# Patient Record
Sex: FEMALE | Race: WHITE | NOT HISPANIC OR LATINO
[De-identification: names, ages, dates, MRNs, and addresses within clinical notes are randomized per-mention and may not be internally consistent; named-entity substitution may affect disease eponyms.]

---

## 2017-04-10 ENCOUNTER — APPOINTMENT (OUTPATIENT)
Dept: HEART AND VASCULAR | Facility: CLINIC | Age: 47
End: 2017-04-10

## 2017-04-10 VITALS
BODY MASS INDEX: 35.51 KG/M2 | WEIGHT: 208 LBS | OXYGEN SATURATION: 98 % | SYSTOLIC BLOOD PRESSURE: 130 MMHG | DIASTOLIC BLOOD PRESSURE: 80 MMHG | HEART RATE: 81 BPM | HEIGHT: 64.25 IN | RESPIRATION RATE: 15 BRPM | TEMPERATURE: 97.8 F

## 2017-04-10 DIAGNOSIS — Z01.818 ENCOUNTER FOR OTHER PREPROCEDURAL EXAMINATION: ICD-10-CM

## 2017-04-10 RX ORDER — CHOLECALCIFEROL (VITAMIN D3) 125 MCG
125 MCG TABLET ORAL
Qty: 30 | Refills: 0 | Status: ACTIVE | COMMUNITY
Start: 2016-07-06

## 2017-04-10 RX ORDER — MOMETASONE 50 UG/1
50 SPRAY, METERED NASAL DAILY
Qty: 1 | Refills: 3 | Status: DISCONTINUED | COMMUNITY
Start: 2017-04-10 | End: 2017-04-10

## 2017-04-12 LAB
ALBUMIN SERPL ELPH-MCNC: 4.2 G/DL
ALP BLD-CCNC: 49 U/L
ALT SERPL-CCNC: 29 U/L
ANION GAP SERPL CALC-SCNC: 15 MMOL/L
APTT BLD: 34.6 SEC
AST SERPL-CCNC: 24 U/L
BASOPHILS # BLD AUTO: 0.02 K/UL
BASOPHILS NFR BLD AUTO: 0.3 %
BILIRUB SERPL-MCNC: 0.2 MG/DL
BUN SERPL-MCNC: 14 MG/DL
CALCIUM SERPL-MCNC: 9.4 MG/DL
CHLORIDE SERPL-SCNC: 101 MMOL/L
CO2 SERPL-SCNC: 20 MMOL/L
CREAT SERPL-MCNC: 0.79 MG/DL
EOSINOPHIL # BLD AUTO: 0.13 K/UL
EOSINOPHIL NFR BLD AUTO: 1.8 %
GLUCOSE SERPL-MCNC: 101 MG/DL
HCT VFR BLD CALC: 40 %
HGB BLD-MCNC: 12.7 G/DL
IMM GRANULOCYTES NFR BLD AUTO: 0.1 %
INR PPP: 0.97 RATIO
LYMPHOCYTES # BLD AUTO: 1.93 K/UL
LYMPHOCYTES NFR BLD AUTO: 26.7 %
MAN DIFF?: NORMAL
MCHC RBC-ENTMCNC: 29.3 PG
MCHC RBC-ENTMCNC: 31.8 GM/DL
MCV RBC AUTO: 92.2 FL
MONOCYTES # BLD AUTO: 0.44 K/UL
MONOCYTES NFR BLD AUTO: 6.1 %
NEUTROPHILS # BLD AUTO: 4.71 K/UL
NEUTROPHILS NFR BLD AUTO: 65 %
PLATELET # BLD AUTO: 359 K/UL
POTASSIUM SERPL-SCNC: 4.6 MMOL/L
PROT SERPL-MCNC: 7.3 G/DL
PT BLD: 11 SEC
RBC # BLD: 4.34 M/UL
RBC # FLD: 13.4 %
SODIUM SERPL-SCNC: 136 MMOL/L
WBC # FLD AUTO: 7.24 K/UL

## 2017-06-13 ENCOUNTER — APPOINTMENT (OUTPATIENT)
Dept: HEART AND VASCULAR | Facility: CLINIC | Age: 47
End: 2017-06-13

## 2017-08-15 ENCOUNTER — APPOINTMENT (OUTPATIENT)
Dept: HEART AND VASCULAR | Facility: CLINIC | Age: 47
End: 2017-08-15
Payer: MEDICARE

## 2017-08-15 VITALS
BODY MASS INDEX: 36.88 KG/M2 | HEART RATE: 118 BPM | RESPIRATION RATE: 15 BRPM | DIASTOLIC BLOOD PRESSURE: 80 MMHG | WEIGHT: 216 LBS | HEIGHT: 64.25 IN | OXYGEN SATURATION: 97 % | SYSTOLIC BLOOD PRESSURE: 120 MMHG

## 2017-08-15 PROCEDURE — 99214 OFFICE O/P EST MOD 30 MIN: CPT

## 2017-10-19 ENCOUNTER — APPOINTMENT (OUTPATIENT)
Dept: HEART AND VASCULAR | Facility: CLINIC | Age: 47
End: 2017-10-19
Payer: MEDICARE

## 2017-10-19 VITALS
SYSTOLIC BLOOD PRESSURE: 118 MMHG | OXYGEN SATURATION: 97 % | BODY MASS INDEX: 36.11 KG/M2 | DIASTOLIC BLOOD PRESSURE: 80 MMHG | RESPIRATION RATE: 15 BRPM | HEART RATE: 78 BPM | TEMPERATURE: 98.4 F | WEIGHT: 212 LBS

## 2017-10-19 PROCEDURE — 99214 OFFICE O/P EST MOD 30 MIN: CPT | Mod: 25

## 2017-10-19 PROCEDURE — 93000 ELECTROCARDIOGRAM COMPLETE: CPT

## 2018-01-29 ENCOUNTER — APPOINTMENT (OUTPATIENT)
Dept: HEART AND VASCULAR | Facility: CLINIC | Age: 48
End: 2018-01-29

## 2018-03-16 ENCOUNTER — APPOINTMENT (OUTPATIENT)
Dept: HEART AND VASCULAR | Facility: CLINIC | Age: 48
End: 2018-03-16
Payer: MEDICARE

## 2018-03-16 VITALS
HEART RATE: 98 BPM | HEIGHT: 64.25 IN | RESPIRATION RATE: 15 BRPM | BODY MASS INDEX: 37.22 KG/M2 | SYSTOLIC BLOOD PRESSURE: 122 MMHG | WEIGHT: 218 LBS | OXYGEN SATURATION: 97 % | TEMPERATURE: 97.4 F | DIASTOLIC BLOOD PRESSURE: 80 MMHG

## 2018-03-16 DIAGNOSIS — I51.9 HEART DISEASE, UNSPECIFIED: ICD-10-CM

## 2018-03-16 PROCEDURE — 99214 OFFICE O/P EST MOD 30 MIN: CPT | Mod: 25

## 2018-03-16 PROCEDURE — 93000 ELECTROCARDIOGRAM COMPLETE: CPT

## 2018-03-16 PROCEDURE — 93306 TTE W/DOPPLER COMPLETE: CPT | Mod: XE

## 2018-03-18 PROBLEM — I51.9 DIASTOLIC DYSFUNCTION, LEFT VENTRICLE: Status: ACTIVE | Noted: 2018-03-18

## 2018-04-02 ENCOUNTER — APPOINTMENT (OUTPATIENT)
Dept: HEART AND VASCULAR | Facility: CLINIC | Age: 48
End: 2018-04-02
Payer: MEDICARE

## 2018-04-02 DIAGNOSIS — E88.81 METABOLIC SYNDROME: ICD-10-CM

## 2018-04-02 PROCEDURE — 93015 CV STRESS TEST SUPVJ I&R: CPT

## 2018-04-02 PROCEDURE — A9500: CPT

## 2018-04-02 PROCEDURE — 78452 HT MUSCLE IMAGE SPECT MULT: CPT

## 2018-05-22 DIAGNOSIS — Z87.09 PERSONAL HISTORY OF OTHER DISEASES OF THE RESPIRATORY SYSTEM: ICD-10-CM

## 2018-09-28 ENCOUNTER — APPOINTMENT (OUTPATIENT)
Dept: HEART AND VASCULAR | Facility: CLINIC | Age: 48
End: 2018-09-28
Payer: MEDICARE

## 2018-09-28 VITALS
BODY MASS INDEX: 49.34 KG/M2 | DIASTOLIC BLOOD PRESSURE: 70 MMHG | HEART RATE: 110 BPM | WEIGHT: 289 LBS | RESPIRATION RATE: 15 BRPM | HEIGHT: 64 IN | SYSTOLIC BLOOD PRESSURE: 110 MMHG | TEMPERATURE: 97.8 F | OXYGEN SATURATION: 96 %

## 2018-09-28 PROCEDURE — 99214 OFFICE O/P EST MOD 30 MIN: CPT

## 2018-12-19 ENCOUNTER — APPOINTMENT (OUTPATIENT)
Dept: GYNECOLOGIC ONCOLOGY | Facility: CLINIC | Age: 48
End: 2018-12-19
Payer: MEDICARE

## 2018-12-19 VITALS
SYSTOLIC BLOOD PRESSURE: 132 MMHG | HEIGHT: 64 IN | DIASTOLIC BLOOD PRESSURE: 94 MMHG | OXYGEN SATURATION: 96 % | WEIGHT: 222.13 LBS | BODY MASS INDEX: 37.92 KG/M2 | HEART RATE: 87 BPM

## 2018-12-19 DIAGNOSIS — R19.07 GENERALIZED INTRA-ABDOMINAL AND PELVIC SWELLING, MASS AND LUMP: ICD-10-CM

## 2018-12-19 DIAGNOSIS — F41.9 ANXIETY DISORDER, UNSPECIFIED: ICD-10-CM

## 2018-12-19 PROCEDURE — 99205 OFFICE O/P NEW HI 60 MIN: CPT

## 2019-01-02 ENCOUNTER — APPOINTMENT (OUTPATIENT)
Dept: HEART AND VASCULAR | Facility: CLINIC | Age: 49
End: 2019-01-02

## 2019-01-10 ENCOUNTER — APPOINTMENT (OUTPATIENT)
Dept: OTOLARYNGOLOGY | Facility: CLINIC | Age: 49
End: 2019-01-10
Payer: MEDICARE

## 2019-01-10 VITALS — DIASTOLIC BLOOD PRESSURE: 83 MMHG | HEART RATE: 98 BPM | SYSTOLIC BLOOD PRESSURE: 128 MMHG | OXYGEN SATURATION: 96 %

## 2019-01-10 VITALS — TEMPERATURE: 98.4 F

## 2019-01-10 DIAGNOSIS — H91.93 UNSPECIFIED HEARING LOSS, BILATERAL: ICD-10-CM

## 2019-01-10 DIAGNOSIS — H81.13 BENIGN PAROXYSMAL VERTIGO, BILATERAL: ICD-10-CM

## 2019-01-10 DIAGNOSIS — E04.2 NONTOXIC MULTINODULAR GOITER: ICD-10-CM

## 2019-01-10 PROCEDURE — 99204 OFFICE O/P NEW MOD 45 MIN: CPT | Mod: 25

## 2019-01-10 PROCEDURE — 92550 TYMPANOMETRY & REFLEX THRESH: CPT

## 2019-01-10 PROCEDURE — 92557 COMPREHENSIVE HEARING TEST: CPT

## 2019-01-10 PROCEDURE — 31575 DIAGNOSTIC LARYNGOSCOPY: CPT

## 2019-01-10 NOTE — BEGINNING OF VISIT
[Patient] : patient [FreeTextEntry1] : PCP is Dr. Segundo Jackson,  Endocrine is Dr. Mccartney at [facility], \par Seth Finkelstein MD is her Gyn

## 2019-01-10 NOTE — PROCEDURE
[Topical Lidocaine] : topical lidocaine [Flexible Endoscope] : examined with the flexible endoscope [Image(s) Captured] : image(s) captured and filed [Unable to Cooperate with Mirror] : patient unable to cooperate with mirror [Gag Reflex] : gag reflex preventing mirror examination [Complicated Symptoms] : complicated symptoms requiring more thorough examination than provided by mirror [Globus] : globus [Serial Number: ___] : Serial Number: [unfilled] [de-identified] : The nasal septum is minimally deviated to the left anteriorly. There are no masses or polyps and the nasal mucosa and secretions are normal. The choanae and posterior nasopharynx are normal without masses or drainage. The inferior turbinates have been partially resected. The Eustachian tube orifices appear patent. The pharynx, including the posterior and lateral pharyngeal walls, the vallecula and base of tongue are normal without ulcerations, lesions or masses. The hypopharynx including the pyriform sinuses open well without pooling of secretions, mucosal lesions or masses. The supraglottic larynx including the epiglottis, petiole, glossoepiglottic folds and pharyngoepiglottic folds are normal without mucosal lesions, ulcerations or masses. The glottis reveals normal false vocal folds. The true vocal folds are glistening white, tense and of equal length, without paralysis, having symmetric mobility on adduction and abduction. There are no mucosal lesions, nodules, cysts, erythroplasia or leukoplakia. The posterior cricoid area has healthy pink mucosa in the interarytenoid area and esophageal inlet. There is moderate thickening/edema of the interarytenoid mucosa suggestive of posterior laryngitis from laryngopharyngeal acid reflux disease. The trachea is clear without narrowing in the immediate subglottic region, without deviation or lesions. \par  [de-identified] : GERD, thyroid nodules, globus.

## 2019-01-10 NOTE — BEGINNING OF VISIT
[Patient] : patient [FreeTextEntry1] : PCP is Dr. Segundo Jackson,  Endocrine is Dr. Mccartney at [facility], \par Seth Finkelstein MD is her Gyn

## 2019-01-10 NOTE — PROCEDURE
[Topical Lidocaine] : topical lidocaine [Flexible Endoscope] : examined with the flexible endoscope [Image(s) Captured] : image(s) captured and filed [Unable to Cooperate with Mirror] : patient unable to cooperate with mirror [Gag Reflex] : gag reflex preventing mirror examination [Complicated Symptoms] : complicated symptoms requiring more thorough examination than provided by mirror [Globus] : globus [Serial Number: ___] : Serial Number: [unfilled] [de-identified] : The nasal septum is minimally deviated to the left anteriorly. There are no masses or polyps and the nasal mucosa and secretions are normal. The choanae and posterior nasopharynx are normal without masses or drainage. The inferior turbinates have been partially resected. The Eustachian tube orifices appear patent. The pharynx, including the posterior and lateral pharyngeal walls, the vallecula and base of tongue are normal without ulcerations, lesions or masses. The hypopharynx including the pyriform sinuses open well without pooling of secretions, mucosal lesions or masses. The supraglottic larynx including the epiglottis, petiole, glossoepiglottic folds and pharyngoepiglottic folds are normal without mucosal lesions, ulcerations or masses. The glottis reveals normal false vocal folds. The true vocal folds are glistening white, tense and of equal length, without paralysis, having symmetric mobility on adduction and abduction. There are no mucosal lesions, nodules, cysts, erythroplasia or leukoplakia. The posterior cricoid area has healthy pink mucosa in the interarytenoid area and esophageal inlet. There is moderate thickening/edema of the interarytenoid mucosa suggestive of posterior laryngitis from laryngopharyngeal acid reflux disease. The trachea is clear without narrowing in the immediate subglottic region, without deviation or lesions. \par  [de-identified] : GERD, thyroid nodules, globus.

## 2019-01-10 NOTE — HISTORY OF PRESENT ILLNESS
[de-identified] : Sowmya is a 42 y/o female who approximately 18 months ago was found to be hypothyroid associated with memory loss, alopecia, slight dysphagia and weight gain.   She also has a history of having had Lyme disease and fibromyalgia.  She has been on thyroid hormone replacement for approximately one year with some improvement in her symptoms on Synthroid brand, 50 mcg.   Her TFTs are now normal but she is concerned about intermittent slight dysphagia for saliva but food and liquids pass easily. She has known GERD and gastritis.  She has had multiple EGDs in the past, last done 4 months ago (Dr. Damian at [facility])  and found to have mild gastritis.  She takes omeprazole regularly since then.  Her weight fluctuates do to lack of exercise from periods of pain due to lumbar spine disease and fibromyalgia. She has irregular menstrual cycles being evaluated by her Gyn MD.  Her depression and anxiety is pain related.  Her mother had hypothyroidism as well as a benign thyroid nodule removed.  There is a history of hypothyroidism on her mother's side of the family but no known history of thyroid cancer.  She has no known radiation exposures to the neck.  She denies any recent voice changes or difficulty breathing other than with exertion but intermittent. Her cardiac w/u in the past had been normal. She does not snore or have excessive sleepiness.  She did not have antithyroid antibodies in the past and the etiology of her hypothyroidism is unknown. She never had a thyroid ultrasound.  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 \par  [FreeTextEntry1] : Sowmya is a healthy 48-year-old woman with hypothyroidism and bilateral subcentimeter thyroid nodules.   She had a thyroid US that was stable in 2017. She has been on thyroid hormone replacement at a dose of Synthroid 50 mcgs daily and was being followed by an endocrinologist Dr. Mccartney.  She has a history of chronic Lyme disease treated 15 years ago. Her weight has fluctuated by 10-15 lbs.  She has not had a follow-up ultrasound since 2017.  She has chronic knee arthritis related to her Lyme disease. She denies any recent voice changes, shortness of breath, neck pain or pressure. She does have occasional episodes of mild pressure when swallowing due to GERD and she is currently on a PPI for this as well. She has moderate globus sensation for the past 6-8 months.  She had an upper GI endoscopy 4-5 years ago and found to  have gastritis. She needed a cardiac stent placed in 2016 and is on ASA.  She has a large uterine fibroid and is to have SIS later this month.  She is concerned about a sensation of swelling in the base of her throat and need for intubation.  Her mother had a thyroidectomy but it was benign. She fell in 2015 and sustained a head injury and felt to have a mild concussion. 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   She has dry eyes at times and rhinorrhea with rare headaches. Her nasal secretions, however, are generally are clear.  She still has intermittent BPPV and her last episode was transient about 1 week ago lasting a few minutes.  She has bilateral tinnitus since her Lyme disease diagnosis.  She has not had a hearing exam for many years.

## 2019-01-10 NOTE — REASON FOR VISIT
[FreeTextEntry2] : follow up multiple thyroid nodules, vertigo, tinnitus, Globus sensation, GERD and Rhinorrhea [FreeTextEntry1] : PCP is Dr. Sargent,  Gyn is Inga Salinas

## 2019-01-10 NOTE — HISTORY OF PRESENT ILLNESS
[de-identified] : Sowmya is a 42 y/o female who approximately 18 months ago was found to be hypothyroid associated with memory loss, alopecia, slight dysphagia and weight gain.   She also has a history of having had Lyme disease and fibromyalgia.  She has been on thyroid hormone replacement for approximately one year with some improvement in her symptoms on Synthroid brand, 50 mcg.   Her TFTs are now normal but she is concerned about intermittent slight dysphagia for saliva but food and liquids pass easily. She has known GERD and gastritis.  She has had multiple EGDs in the past, last done 4 months ago (Dr. Damian at [facility])  and found to have mild gastritis.  She takes omeprazole regularly since then.  Her weight fluctuates do to lack of exercise from periods of pain due to lumbar spine disease and fibromyalgia. She has irregular menstrual cycles being evaluated by her Gyn MD.  Her depression and anxiety is pain related.  Her mother had hypothyroidism as well as a benign thyroid nodule removed.  There is a history of hypothyroidism on her mother's side of the family but no known history of thyroid cancer.  She has no known radiation exposures to the neck.  She denies any recent voice changes or difficulty breathing other than with exertion but intermittent. Her cardiac w/u in the past had been normal. She does not snore or have excessive sleepiness.  She did not have antithyroid antibodies in the past and the etiology of her hypothyroidism is unknown. She never had a thyroid ultrasound.  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 \par  [FreeTextEntry1] : Sowmya is a healthy 48-year-old woman with hypothyroidism and bilateral subcentimeter thyroid nodules.   She had a thyroid US that was stable in 2017. She has been on thyroid hormone replacement at a dose of Synthroid 50 mcgs daily and was being followed by an endocrinologist Dr. Mccartney.  She has a history of chronic Lyme disease treated 15 years ago. Her weight has fluctuated by 10-15 lbs.  She has not had a follow-up ultrasound since 2017.  She has chronic knee arthritis related to her Lyme disease. She denies any recent voice changes, shortness of breath, neck pain or pressure. She does have occasional episodes of mild pressure when swallowing due to GERD and she is currently on a PPI for this as well. She has moderate globus sensation for the past 6-8 months.  She had an upper GI endoscopy 4-5 years ago and found to  have gastritis. She needed a cardiac stent placed in 2016 and is on ASA.  She has a large uterine fibroid and is to have SIS later this month.  She is concerned about a sensation of swelling in the base of her throat and need for intubation.  Her mother had a thyroidectomy but it was benign. She fell in 2015 and sustained a head injury and felt to have a mild concussion. 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   She has dry eyes at times and rhinorrhea with rare headaches. Her nasal secretions, however, are generally are clear.  She still has intermittent BPPV and her last episode was transient about 1 week ago lasting a few minutes.  She has bilateral tinnitus since her Lyme disease diagnosis.  She has not had a hearing exam for many years.

## 2019-01-10 NOTE — HISTORY OF PRESENT ILLNESS
[de-identified] : Sowmya is a 42 y/o female who approximately 18 months ago was found to be hypothyroid associated with memory loss, alopecia, slight dysphagia and weight gain.   She also has a history of having had Lyme disease and fibromyalgia.  She has been on thyroid hormone replacement for approximately one year with some improvement in her symptoms on Synthroid brand, 50 mcg.   Her TFTs are now normal but she is concerned about intermittent slight dysphagia for saliva but food and liquids pass easily. She has known GERD and gastritis.  She has had multiple EGDs in the past, last done 4 months ago (Dr. Damian at [facility])  and found to have mild gastritis.  She takes omeprazole regularly since then.  Her weight fluctuates do to lack of exercise from periods of pain due to lumbar spine disease and fibromyalgia. She has irregular menstrual cycles being evaluated by her Gyn MD.  Her depression and anxiety is pain related.  Her mother had hypothyroidism as well as a benign thyroid nodule removed.  There is a history of hypothyroidism on her mother's side of the family but no known history of thyroid cancer.  She has no known radiation exposures to the neck.  She denies any recent voice changes or difficulty breathing other than with exertion but intermittent. Her cardiac w/u in the past had been normal. She does not snore or have excessive sleepiness.  She did not have antithyroid antibodies in the past and the etiology of her hypothyroidism is unknown. She never had a thyroid ultrasound.  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 \par  [FreeTextEntry1] : Sowmya is a healthy 48-year-old woman with hypothyroidism and bilateral subcentimeter thyroid nodules.   She had a thyroid US that was stable in 2017. She has been on thyroid hormone replacement at a dose of Synthroid 50 mcgs daily and was being followed by an endocrinologist Dr. Mccartney.  She has a history of chronic Lyme disease treated 15 years ago. Her weight has fluctuated by 10-15 lbs.  She has not had a follow-up ultrasound since 2017.  She has chronic knee arthritis related to her Lyme disease. She denies any recent voice changes, shortness of breath, neck pain or pressure. She does have occasional episodes of mild pressure when swallowing due to GERD and she is currently on a PPI for this as well. She has moderate globus sensation for the past 6-8 months.  She had an upper GI endoscopy 4-5 years ago and found to  have gastritis. She needed a cardiac stent placed in 2016 and is on ASA.  She has a large uterine fibroid and is to have SIS later this month.  She is concerned about a sensation of swelling in the base of her throat and need for intubation.  Her mother had a thyroidectomy but it was benign. She fell in 2015 and sustained a head injury and felt to have a mild concussion. 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   She has dry eyes at times and rhinorrhea with rare headaches. Her nasal secretions, however, are generally are clear.  She still has intermittent BPPV and her last episode was transient about 1 week ago lasting a few minutes.  She has bilateral tinnitus since her Lyme disease diagnosis.  She has not had a hearing exam for many years.

## 2019-01-10 NOTE — BEGINNING OF VISIT
[Patient] : patient [FreeTextEntry1] : PCP is Dr. Segundo Jackson,  Endocrine is Dr. Mccartney at [facility], \par Seth Finkelstein MD is her Gyn

## 2019-01-10 NOTE — PROCEDURE
[Topical Lidocaine] : topical lidocaine [Flexible Endoscope] : examined with the flexible endoscope [Image(s) Captured] : image(s) captured and filed [Unable to Cooperate with Mirror] : patient unable to cooperate with mirror [Gag Reflex] : gag reflex preventing mirror examination [Complicated Symptoms] : complicated symptoms requiring more thorough examination than provided by mirror [Globus] : globus [Serial Number: ___] : Serial Number: [unfilled] [de-identified] : The nasal septum is minimally deviated to the left anteriorly. There are no masses or polyps and the nasal mucosa and secretions are normal. The choanae and posterior nasopharynx are normal without masses or drainage. The inferior turbinates have been partially resected. The Eustachian tube orifices appear patent. The pharynx, including the posterior and lateral pharyngeal walls, the vallecula and base of tongue are normal without ulcerations, lesions or masses. The hypopharynx including the pyriform sinuses open well without pooling of secretions, mucosal lesions or masses. The supraglottic larynx including the epiglottis, petiole, glossoepiglottic folds and pharyngoepiglottic folds are normal without mucosal lesions, ulcerations or masses. The glottis reveals normal false vocal folds. The true vocal folds are glistening white, tense and of equal length, without paralysis, having symmetric mobility on adduction and abduction. There are no mucosal lesions, nodules, cysts, erythroplasia or leukoplakia. The posterior cricoid area has healthy pink mucosa in the interarytenoid area and esophageal inlet. There is moderate thickening/edema of the interarytenoid mucosa suggestive of posterior laryngitis from laryngopharyngeal acid reflux disease. The trachea is clear without narrowing in the immediate subglottic region, without deviation or lesions. \par  [de-identified] : GERD, thyroid nodules, globus.

## 2019-01-22 ENCOUNTER — APPOINTMENT (OUTPATIENT)
Dept: HEART AND VASCULAR | Facility: CLINIC | Age: 49
End: 2019-01-22
Payer: MEDICARE

## 2019-01-22 VITALS
BODY MASS INDEX: 38.24 KG/M2 | WEIGHT: 224 LBS | HEIGHT: 64 IN | OXYGEN SATURATION: 96 % | DIASTOLIC BLOOD PRESSURE: 80 MMHG | TEMPERATURE: 98.3 F | HEART RATE: 87 BPM | SYSTOLIC BLOOD PRESSURE: 130 MMHG | RESPIRATION RATE: 14 BRPM

## 2019-01-22 VITALS
HEART RATE: 91 BPM | SYSTOLIC BLOOD PRESSURE: 132 MMHG | DIASTOLIC BLOOD PRESSURE: 80 MMHG | HEIGHT: 64 IN | TEMPERATURE: 98.3 F | OXYGEN SATURATION: 96 % | RESPIRATION RATE: 15 BRPM | BODY MASS INDEX: 38.41 KG/M2 | WEIGHT: 225 LBS

## 2019-01-22 DIAGNOSIS — Z01.810 ENCOUNTER FOR PREPROCEDURAL CARDIOVASCULAR EXAMINATION: ICD-10-CM

## 2019-01-22 DIAGNOSIS — R06.00 DYSPNEA, UNSPECIFIED: ICD-10-CM

## 2019-01-22 PROCEDURE — 93306 TTE W/DOPPLER COMPLETE: CPT

## 2019-01-22 PROCEDURE — 99214 OFFICE O/P EST MOD 30 MIN: CPT | Mod: 57

## 2019-01-22 PROCEDURE — 93000 ELECTROCARDIOGRAM COMPLETE: CPT

## 2019-01-23 NOTE — ASSESSMENT
[FreeTextEntry1] : HTN controlled\par \par CAD stable\par \par enlarging uterine mass \par \par umbilical and ventral hernias

## 2019-01-23 NOTE — PHYSICAL EXAM
[Well Groomed] : well groomed [No Deformities] : no deformities [General Appearance - In No Acute Distress] : no acute distress [Normal Conjunctiva] : the conjunctiva exhibited no abnormalities [Eyelids - No Xanthelasma] : the eyelids demonstrated no xanthelasmas [No Oral Cyanosis] : no oral cyanosis [Normal Jugular Venous A Waves Present] : normal jugular venous A waves present [Normal Jugular Venous V Waves Present] : normal jugular venous V waves present [No Jugular Venous Rothman A Waves] : no jugular venous rothman A waves [Respiration, Rhythm And Depth] : normal respiratory rhythm and effort [Exaggerated Use Of Accessory Muscles For Inspiration] : no accessory muscle use [Auscultation Breath Sounds / Voice Sounds] : lungs were clear to auscultation bilaterally [Heart Rate And Rhythm] : heart rate and rhythm were normal [Heart Sounds] : normal S1 and S2 [Murmurs] : no murmurs present [Arterial Pulses Normal] : the arterial pulses were normal [Edema] : no peripheral edema present [Abnormal Walk] : normal gait [Gait - Sufficient For Exercise Testing] : the gait was sufficient for exercise testing [Nail Clubbing] : no clubbing of the fingernails [Cyanosis, Localized] : no localized cyanosis [Petechial Hemorrhages (___cm)] : no petechial hemorrhages [Nail Splinter Hemorrhages] : no splinter hemorrhages of the nails [Fingers Osler's Nodes] : Osler's nodes were not seenon the fingers [Skin Color & Pigmentation] : normal skin color and pigmentation [Skin Turgor] : normal skin turgor [] : no rash [No Venous Stasis] : no venous stasis [Skin Lesions] : no skin lesions [No Skin Ulcers] : no skin ulcer [No Xanthoma] : no  xanthoma was observed [Oriented To Time, Place, And Person] : oriented to person, place, and time [Mood] : the mood was normal [FreeTextEntry1] : obese abdomen    midline  5 cm vertical  scar post ventral hernia repair with mesh

## 2019-01-23 NOTE — DISCUSSION/SUMMARY
[Procedure Low Risk] : the procedure risk is low [Patient Low Risk] : the patient is a low surgical risk [Optimized for Surgery Pending Laboratory Results] : the patient is optimized for surgery pending laboratory results [As per surgery] : as per surgery [FreeTextEntry3] : continue low dose aspirin without interruption  [FreeTextEntry1] : Hold Advil, motrin etc  for 7 to 10 days prior to surgery

## 2019-01-23 NOTE — REVIEW OF SYSTEMS
[Recent Weight Gain (___ Lbs)] : recent [unfilled] ~Ulb weight gain [Dyspnea on exertion] : dyspnea during exertion [Abdominal Pain] : abdominal pain [Anxiety] : anxiety [Negative] : Heme/Lymph [Chest  Pressure] : no chest pressure [Lower Ext Edema] : no extremity edema [Leg Claudication] : no intermittent leg claudication [Palpitations] : no palpitations [Heartburn] : no heartburn [Dysphagia] : no dysphagia [Confusion] : no confusion was observed [Memory Lapses Or Loss] : no memory lapses or loss [Depression] : no depression [Under Stress] : not under stress [Suicidal] : not suicidal

## 2019-01-23 NOTE — HISTORY OF PRESENT ILLNESS
[Preoperative Visit] : for a medical evaluation prior to surgery [Date of Surgery ___] : on [unfilled] [Stable] : Stable [Dyspnea] : dyspnea [Abdominal Pain] : abdominal pain [Poor Exercise Tolerance] : poor exercise tolerance [Cardiovascular Disease] : cardiovascular disease [Frequent Aspirin Use] : frequent aspirin use [Prior Anesthesia] : Prior anesthesia [Electrocardiogram] : ~T an ECG ~C was performed [Echocardiogram] : ~T an echocardiogram ~C was performed [Cardiac Catheterization  (Diagnostic)] : cardiac catheterization ~T ~C was performed [Cardiovascular Stress Test] : a cardiac stress test ~T ~C was performed [Metabolic Capacity ___Mets%] : The patient has a metabolic capacity of [unfilled] Mets%  [Fair] : Fair [Scheduled Procedure ___] : a [unfilled] [Surgeon Name ___] : surgeon: [unfilled] [Fever] : no fever [Chills] : no chills [Fatigue] : no fatigue [Chest Pain] : no chest pain [Cough] : no cough [Dysuria] : no dysuria [Urinary Frequency] : no urinary frequency [Nausea] : no nausea [Vomiting] : no vomiting [Diarrhea] : no diarrhea [Easy Bruising] : no easy bruising [Lower Extremity Swelling] : no lower extremity swelling [Diabetes] : no diabetes [Dyspnea on Exertion] : difficulty breathing during exertion [Pulmonary Disease] : no pulmonary disease [Anti-Platelet Agents] : no anti-platelet agents [Nicotine Dependence] : no nicotine dependence [Alcohol Use] : no  alcohol use [Renal Disease] : no renal disease [GI Disease] : no gastrointestinal disease [Sleep Apnea] : no sleep apnea [Thromboembolic Problems] : no thromboembolic problems [Frequent use of NSAIDs] : no use of NSAIDs [Transfusion Reaction] : no transfusion reaction [Impaired Immunity] : no impaired immunity [Steroid Use in Last 6 Months] : no steroid use in the last six months [Prev Anesthesia Reaction] : no previous anesthesia reaction [Anesthesia Reaction] : no anesthesia reaction [Sudden Death] : no sudden death [Clotting Disorder] : no clotting disorder [Bleeding Disorder] : no bleeding disorder [FreeTextEntry1] : 48 year old female with established CAD  s/p PTCA/NEETU of m LAD. Normal LVEF  without CHF.  Ventral hernia repair in the past  and now has recurrence of ventral hernia with diagnosis of very large uterine fibroid causing pressure on the abdominal wall.   She reports that her imaging and biopsy confirmed  benign pathology.   She has prediabetes  and has refuses bariatric surgery in the past. \par \par Had not seen gynecologist in years  until recently \par \par Last nuclear stress test was April 2018 - no ischemia, normal LVEF\par \par \par EKG today shows NSR 87 bpm without ectopy, ischemia or LVH\par \par \par Saw her gynecologist  , Dr. Barajas who detected the mass , endometrial biopsy negative \par \par Saw general laparoscopic surgeon, WANDA Andrew at [facility]-  but she did not have the CT done in late October.

## 2019-01-29 ENCOUNTER — APPOINTMENT (OUTPATIENT)
Dept: GYNECOLOGIC ONCOLOGY | Facility: HOSPITAL | Age: 49
End: 2019-01-29

## 2019-02-06 VITALS
HEIGHT: 64 IN | DIASTOLIC BLOOD PRESSURE: 63 MMHG | HEART RATE: 100 BPM | TEMPERATURE: 97 F | SYSTOLIC BLOOD PRESSURE: 138 MMHG | WEIGHT: 230.6 LBS | RESPIRATION RATE: 18 BRPM | OXYGEN SATURATION: 98 %

## 2019-02-06 LAB
ALBUMIN SERPL ELPH-MCNC: 4.6 G/DL
ALP BLD-CCNC: 39 U/L
ALT SERPL-CCNC: 32 U/L
ANION GAP SERPL CALC-SCNC: 14 MMOL/L
APTT BLD: 33.1 SEC
AST SERPL-CCNC: 25 U/L
BASOPHILS # BLD AUTO: 0.04 K/UL
BASOPHILS NFR BLD AUTO: 0.5 %
BILIRUB SERPL-MCNC: 0.2 MG/DL
BUN SERPL-MCNC: 13 MG/DL
CALCIUM SERPL-MCNC: 10.1 MG/DL
CHLORIDE SERPL-SCNC: 100 MMOL/L
CO2 SERPL-SCNC: 25 MMOL/L
CREAT SERPL-MCNC: 0.8 MG/DL
EOSINOPHIL # BLD AUTO: 0.19 K/UL
EOSINOPHIL NFR BLD AUTO: 2.6 %
GLUCOSE SERPL-MCNC: 94 MG/DL
HBA1C MFR BLD HPLC: 5.7 %
HCT VFR BLD CALC: 43.2 %
HGB BLD-MCNC: 13.4 G/DL
IMM GRANULOCYTES NFR BLD AUTO: 0.1 %
INR PPP: 0.97 RATIO
LYMPHOCYTES # BLD AUTO: 2.78 K/UL
LYMPHOCYTES NFR BLD AUTO: 38.1 %
MAN DIFF?: NORMAL
MCHC RBC-ENTMCNC: 28.3 PG
MCHC RBC-ENTMCNC: 31 GM/DL
MCV RBC AUTO: 91.1 FL
MONOCYTES # BLD AUTO: 0.69 K/UL
MONOCYTES NFR BLD AUTO: 9.5 %
NEUTROPHILS # BLD AUTO: 3.59 K/UL
NEUTROPHILS NFR BLD AUTO: 49.2 %
PLATELET # BLD AUTO: 347 K/UL
POTASSIUM SERPL-SCNC: 4.7 MMOL/L
PROT SERPL-MCNC: 7.6 G/DL
PT BLD: 10.8 SEC
RBC # BLD: 4.74 M/UL
RBC # FLD: 14.4 %
SODIUM SERPL-SCNC: 139 MMOL/L
WBC # FLD AUTO: 7.3 K/UL

## 2019-02-06 NOTE — PRE-OP CHECKLIST - SELECT TESTS ORDERED
INR/EKG/CBC/Echo, Nuclear Stress Test; Cardiac Clearance/PT/PTT/CMP EKG/Echo, Nuclear Stress Test; Cardiac Clearance; pregnancy test- negative/CMP/CBC/PT/PTT/INR

## 2019-02-07 ENCOUNTER — APPOINTMENT (OUTPATIENT)
Dept: GYNECOLOGIC ONCOLOGY | Facility: HOSPITAL | Age: 49
End: 2019-02-07

## 2019-02-07 ENCOUNTER — INPATIENT (INPATIENT)
Facility: HOSPITAL | Age: 49
LOS: 1 days | Discharge: HOME | DRG: 743 | End: 2019-02-09
Attending: OBSTETRICS & GYNECOLOGY | Admitting: OBSTETRICS & GYNECOLOGY
Payer: MEDICARE

## 2019-02-07 ENCOUNTER — RESULT REVIEW (OUTPATIENT)
Age: 49
End: 2019-02-07

## 2019-02-07 DIAGNOSIS — Z98.89 OTHER SPECIFIED POSTPROCEDURAL STATES: Chronic | ICD-10-CM

## 2019-02-07 DIAGNOSIS — S01.20XA UNSPECIFIED OPEN WOUND OF NOSE, INITIAL ENCOUNTER: Chronic | ICD-10-CM

## 2019-02-07 DIAGNOSIS — Z98.61 CORONARY ANGIOPLASTY STATUS: Chronic | ICD-10-CM

## 2019-02-07 DIAGNOSIS — Z98.890 OTHER SPECIFIED POSTPROCEDURAL STATES: Chronic | ICD-10-CM

## 2019-02-07 DIAGNOSIS — Z86.39 PERSONAL HISTORY OF OTHER ENDOCRINE, NUTRITIONAL AND METABOLIC DISEASE: Chronic | ICD-10-CM

## 2019-02-07 LAB
ANION GAP SERPL CALC-SCNC: 12 MMOL/L — SIGNIFICANT CHANGE UP (ref 5–17)
APPEARANCE UR: CLEAR — SIGNIFICANT CHANGE UP
BILIRUB UR-MCNC: NEGATIVE — SIGNIFICANT CHANGE UP
BUN SERPL-MCNC: 13 MG/DL — SIGNIFICANT CHANGE UP (ref 7–23)
CALCIUM SERPL-MCNC: 8.2 MG/DL — LOW (ref 8.4–10.5)
CHLORIDE SERPL-SCNC: 105 MMOL/L — SIGNIFICANT CHANGE UP (ref 96–108)
CO2 SERPL-SCNC: 20 MMOL/L — LOW (ref 22–31)
COLOR SPEC: YELLOW — SIGNIFICANT CHANGE UP
CREAT SERPL-MCNC: 0.66 MG/DL — SIGNIFICANT CHANGE UP (ref 0.5–1.3)
DIFF PNL FLD: NEGATIVE — SIGNIFICANT CHANGE UP
GLUCOSE BLDC GLUCOMTR-MCNC: 97 MG/DL — SIGNIFICANT CHANGE UP (ref 70–99)
GLUCOSE SERPL-MCNC: 190 MG/DL — HIGH (ref 70–99)
GLUCOSE UR QL: NEGATIVE — SIGNIFICANT CHANGE UP
HCT VFR BLD CALC: 34.7 % — SIGNIFICANT CHANGE UP (ref 34.5–45)
HGB BLD-MCNC: 11.1 G/DL — LOW (ref 11.5–15.5)
KETONES UR-MCNC: ABNORMAL MG/DL
LEUKOCYTE ESTERASE UR-ACNC: NEGATIVE — SIGNIFICANT CHANGE UP
MCHC RBC-ENTMCNC: 29.8 PG — SIGNIFICANT CHANGE UP (ref 27–34)
MCHC RBC-ENTMCNC: 32 GM/DL — SIGNIFICANT CHANGE UP (ref 32–36)
MCV RBC AUTO: 93 FL — SIGNIFICANT CHANGE UP (ref 80–100)
NITRITE UR-MCNC: NEGATIVE — SIGNIFICANT CHANGE UP
NRBC # BLD: 0 /100 WBCS — SIGNIFICANT CHANGE UP (ref 0–0)
PH UR: 5.5 — SIGNIFICANT CHANGE UP (ref 5–8)
PLATELET # BLD AUTO: 251 K/UL — SIGNIFICANT CHANGE UP (ref 150–400)
POTASSIUM SERPL-MCNC: 4 MMOL/L — SIGNIFICANT CHANGE UP (ref 3.5–5.3)
POTASSIUM SERPL-SCNC: 4 MMOL/L — SIGNIFICANT CHANGE UP (ref 3.5–5.3)
PROT UR-MCNC: NEGATIVE MG/DL — SIGNIFICANT CHANGE UP
RBC # BLD: 3.73 M/UL — LOW (ref 3.8–5.2)
RBC # FLD: 13.8 % — SIGNIFICANT CHANGE UP (ref 10.3–14.5)
SODIUM SERPL-SCNC: 137 MMOL/L — SIGNIFICANT CHANGE UP (ref 135–145)
SP GR SPEC: >=1.03 — SIGNIFICANT CHANGE UP (ref 1–1.03)
UROBILINOGEN FLD QL: 0.2 E.U./DL — SIGNIFICANT CHANGE UP
WBC # BLD: 12.03 K/UL — HIGH (ref 3.8–10.5)
WBC # FLD AUTO: 12.03 K/UL — HIGH (ref 3.8–10.5)

## 2019-02-07 PROCEDURE — 52332 CYSTOSCOPY AND TREATMENT: CPT | Mod: 50

## 2019-02-07 PROCEDURE — 58210 EXTENSIVE HYSTERECTOMY: CPT | Mod: 22

## 2019-02-07 PROCEDURE — 50715 RELEASE OF URETER: CPT | Mod: 59

## 2019-02-07 PROCEDURE — 49000 EXPLORATION OF ABDOMEN: CPT

## 2019-02-07 RX ORDER — SODIUM CHLORIDE 9 MG/ML
1000 INJECTION, SOLUTION INTRAVENOUS
Qty: 0 | Refills: 0 | Status: DISCONTINUED | OUTPATIENT
Start: 2019-02-07 | End: 2019-02-08

## 2019-02-07 RX ORDER — CELECOXIB 200 MG/1
400 CAPSULE ORAL ONCE
Qty: 0 | Refills: 0 | Status: COMPLETED | OUTPATIENT
Start: 2019-02-07 | End: 2019-02-07

## 2019-02-07 RX ORDER — GABAPENTIN 400 MG/1
600 CAPSULE ORAL ONCE
Qty: 0 | Refills: 0 | Status: COMPLETED | OUTPATIENT
Start: 2019-02-07 | End: 2019-02-07

## 2019-02-07 RX ORDER — SIMETHICONE 80 MG/1
80 TABLET, CHEWABLE ORAL EVERY 6 HOURS
Qty: 0 | Refills: 0 | Status: DISCONTINUED | OUTPATIENT
Start: 2019-02-07 | End: 2019-02-09

## 2019-02-07 RX ORDER — LEVOTHYROXINE SODIUM 125 MCG
50 TABLET ORAL DAILY
Qty: 0 | Refills: 0 | Status: DISCONTINUED | OUTPATIENT
Start: 2019-02-07 | End: 2019-02-09

## 2019-02-07 RX ORDER — HYDROMORPHONE HYDROCHLORIDE 2 MG/ML
30 INJECTION INTRAMUSCULAR; INTRAVENOUS; SUBCUTANEOUS
Qty: 0 | Refills: 0 | Status: DISCONTINUED | OUTPATIENT
Start: 2019-02-07 | End: 2019-02-08

## 2019-02-07 RX ORDER — OXYCODONE HYDROCHLORIDE 5 MG/1
5 TABLET ORAL EVERY 4 HOURS
Qty: 0 | Refills: 0 | Status: DISCONTINUED | OUTPATIENT
Start: 2019-02-07 | End: 2019-02-09

## 2019-02-07 RX ORDER — KETOROLAC TROMETHAMINE 30 MG/ML
30 SYRINGE (ML) INJECTION EVERY 6 HOURS
Qty: 0 | Refills: 0 | Status: DISCONTINUED | OUTPATIENT
Start: 2019-02-07 | End: 2019-02-08

## 2019-02-07 RX ORDER — OXYCODONE HYDROCHLORIDE 5 MG/1
10 TABLET ORAL EVERY 6 HOURS
Qty: 0 | Refills: 0 | Status: DISCONTINUED | OUTPATIENT
Start: 2019-02-07 | End: 2019-02-09

## 2019-02-07 RX ORDER — ONDANSETRON 8 MG/1
4 TABLET, FILM COATED ORAL EVERY 6 HOURS
Qty: 0 | Refills: 0 | Status: DISCONTINUED | OUTPATIENT
Start: 2019-02-07 | End: 2019-02-07

## 2019-02-07 RX ORDER — ENOXAPARIN SODIUM 100 MG/ML
40 INJECTION SUBCUTANEOUS EVERY 24 HOURS
Qty: 0 | Refills: 0 | Status: DISCONTINUED | OUTPATIENT
Start: 2019-02-08 | End: 2019-02-09

## 2019-02-07 RX ORDER — METOCLOPRAMIDE HCL 10 MG
10 TABLET ORAL ONCE
Qty: 0 | Refills: 0 | Status: DISCONTINUED | OUTPATIENT
Start: 2019-02-07 | End: 2019-02-08

## 2019-02-07 RX ORDER — DOCUSATE SODIUM 100 MG
100 CAPSULE ORAL
Qty: 0 | Refills: 0 | Status: DISCONTINUED | OUTPATIENT
Start: 2019-02-07 | End: 2019-02-09

## 2019-02-07 RX ORDER — HYDROMORPHONE HYDROCHLORIDE 2 MG/ML
0.5 INJECTION INTRAMUSCULAR; INTRAVENOUS; SUBCUTANEOUS ONCE
Qty: 0 | Refills: 0 | Status: DISCONTINUED | OUTPATIENT
Start: 2019-02-07 | End: 2019-02-08

## 2019-02-07 RX ORDER — ONDANSETRON 8 MG/1
8 TABLET, FILM COATED ORAL EVERY 8 HOURS
Qty: 0 | Refills: 0 | Status: COMPLETED | OUTPATIENT
Start: 2019-02-07 | End: 2019-02-08

## 2019-02-07 RX ORDER — HEPARIN SODIUM 5000 [USP'U]/ML
5000 INJECTION INTRAVENOUS; SUBCUTANEOUS ONCE
Qty: 0 | Refills: 0 | Status: COMPLETED | OUTPATIENT
Start: 2019-02-07 | End: 2019-02-07

## 2019-02-07 RX ORDER — ACETAMINOPHEN 500 MG
975 TABLET ORAL EVERY 8 HOURS
Qty: 0 | Refills: 0 | Status: DISCONTINUED | OUTPATIENT
Start: 2019-02-07 | End: 2019-02-09

## 2019-02-07 RX ORDER — NALOXONE HYDROCHLORIDE 4 MG/.1ML
0.1 SPRAY NASAL
Qty: 0 | Refills: 0 | Status: DISCONTINUED | OUTPATIENT
Start: 2019-02-07 | End: 2019-02-08

## 2019-02-07 RX ORDER — BUPIVACAINE 13.3 MG/ML
20 INJECTION, SUSPENSION, LIPOSOMAL INFILTRATION ONCE
Qty: 0 | Refills: 0 | Status: DISCONTINUED | OUTPATIENT
Start: 2019-02-07 | End: 2019-02-09

## 2019-02-07 RX ORDER — PANTOPRAZOLE SODIUM 20 MG/1
40 TABLET, DELAYED RELEASE ORAL
Qty: 0 | Refills: 0 | Status: DISCONTINUED | OUTPATIENT
Start: 2019-02-07 | End: 2019-02-08

## 2019-02-07 RX ORDER — ACETAMINOPHEN 500 MG
1000 TABLET ORAL ONCE
Qty: 0 | Refills: 0 | Status: COMPLETED | OUTPATIENT
Start: 2019-02-07 | End: 2019-02-07

## 2019-02-07 RX ADMIN — HEPARIN SODIUM 5000 UNIT(S): 5000 INJECTION INTRAVENOUS; SUBCUTANEOUS at 15:19

## 2019-02-07 RX ADMIN — Medication 1000 MILLIGRAM(S): at 15:18

## 2019-02-07 RX ADMIN — HYDROMORPHONE HYDROCHLORIDE 30 MILLILITER(S): 2 INJECTION INTRAMUSCULAR; INTRAVENOUS; SUBCUTANEOUS at 22:06

## 2019-02-07 RX ADMIN — Medication 30 MILLIGRAM(S): at 23:57

## 2019-02-07 RX ADMIN — CELECOXIB 400 MILLIGRAM(S): 200 CAPSULE ORAL at 15:19

## 2019-02-07 RX ADMIN — GABAPENTIN 600 MILLIGRAM(S): 400 CAPSULE ORAL at 15:19

## 2019-02-07 NOTE — BRIEF OPERATIVE NOTE - PROCEDURE
<<-----Click on this checkbox to enter Procedure Lysis of adhesions  02/07/2019    Active  PBORGER  Exploratory laparotomy  02/07/2019    Active  PBORGER

## 2019-02-07 NOTE — BRIEF OPERATIVE NOTE - OPERATION/FINDINGS
Exploratory laparotomy with lysis of adhesions to facilitate total abdominal hysterectomy, bilateral salpingectomy, and uterine mass resection. Intra-operatively ureters were identified, however 2/2 c/f ureteral injury bilateral ureteral stents were placed by urology (to be removed after closure).
32 cm large and globular fibroid uterus  normal fallopian tubes and ovaries  no hernia per general surgery  cystoscopy and stents performed at end of case to r/o ureteral injury

## 2019-02-07 NOTE — H&P ADULT - ASSESSMENT
47 yo P0 currently menstruating presents for scheduled exploratory laparotomy, SIS, BS for rapidly enlarging fibroid uterus w/ imaging concerning for possible sarcoma  - admit for routine postoperative care

## 2019-02-07 NOTE — H&P ADULT - PSH
H/O nasal septoplasty    H/O sinus surgery    H/O thyroid disease    H/O ventral hernia repair    History of bilateral breast reduction surgery    History of PTCA  2 years ago  NEETU of mLAD  Wound, open, nasal sinus

## 2019-02-07 NOTE — BRIEF OPERATIVE NOTE - COMMENTS
Blood loss recorded was at time of exit from room, Dr. Peña and resident closed patient as they are primary.

## 2019-02-07 NOTE — BRIEF OPERATIVE NOTE - PROCEDURE
<<-----Click on this checkbox to enter Procedure Ureteral stent placement, intraoperatively (not at tx)  02/07/2019    Active  [email or web address]  Cystoscopy  02/07/2019    Active  [email or web address]  Bilateral salpingectomy  02/07/2019    Active  [email or web address]  Radical hysterectomy  02/07/2019    Active  [email or web address]  Lysis of adhesions  02/07/2019    Rolando Quintero  Exploratory laparotomy  02/07/2019    Active  Rolando Lopez

## 2019-02-07 NOTE — H&P ADULT - HISTORY OF PRESENT ILLNESS
49 yo P0 currently menstruating presents for scheduled exploratory laparotomy, SIS, BS for rapidly enlarging fibroid uterus w/ imaging concerning for possible sarcoma  hx of chronic lyme disease, hypothyroid, CAD s/p stent 2016, chronic sinusitis   hx of ventral hernia repair w/ mesh in 2017

## 2019-02-08 ENCOUNTER — TRANSCRIPTION ENCOUNTER (OUTPATIENT)
Age: 49
End: 2019-02-08

## 2019-02-08 LAB
ANION GAP SERPL CALC-SCNC: 12 MMOL/L — SIGNIFICANT CHANGE UP (ref 5–17)
BUN SERPL-MCNC: 11 MG/DL — SIGNIFICANT CHANGE UP (ref 7–23)
CALCIUM SERPL-MCNC: 9 MG/DL — SIGNIFICANT CHANGE UP (ref 8.4–10.5)
CHLORIDE SERPL-SCNC: 105 MMOL/L — SIGNIFICANT CHANGE UP (ref 96–108)
CO2 SERPL-SCNC: 21 MMOL/L — LOW (ref 22–31)
CREAT SERPL-MCNC: 0.68 MG/DL — SIGNIFICANT CHANGE UP (ref 0.5–1.3)
GLUCOSE SERPL-MCNC: 156 MG/DL — HIGH (ref 70–99)
HCT VFR BLD CALC: 33.9 % — LOW (ref 34.5–45)
HGB BLD-MCNC: 10.7 G/DL — LOW (ref 11.5–15.5)
MAGNESIUM SERPL-MCNC: 1.9 MG/DL — SIGNIFICANT CHANGE UP (ref 1.6–2.6)
MCHC RBC-ENTMCNC: 29.4 PG — SIGNIFICANT CHANGE UP (ref 27–34)
MCHC RBC-ENTMCNC: 31.6 GM/DL — LOW (ref 32–36)
MCV RBC AUTO: 93.1 FL — SIGNIFICANT CHANGE UP (ref 80–100)
NRBC # BLD: 0 /100 WBCS — SIGNIFICANT CHANGE UP (ref 0–0)
PHOSPHATE SERPL-MCNC: 2.2 MG/DL — LOW (ref 2.5–4.5)
PLATELET # BLD AUTO: 256 K/UL — SIGNIFICANT CHANGE UP (ref 150–400)
POTASSIUM SERPL-MCNC: 4.6 MMOL/L — SIGNIFICANT CHANGE UP (ref 3.5–5.3)
POTASSIUM SERPL-SCNC: 4.6 MMOL/L — SIGNIFICANT CHANGE UP (ref 3.5–5.3)
RBC # BLD: 3.64 M/UL — LOW (ref 3.8–5.2)
RBC # FLD: 13.7 % — SIGNIFICANT CHANGE UP (ref 10.3–14.5)
SODIUM SERPL-SCNC: 138 MMOL/L — SIGNIFICANT CHANGE UP (ref 135–145)
WBC # BLD: 11.52 K/UL — HIGH (ref 3.8–10.5)
WBC # FLD AUTO: 11.52 K/UL — HIGH (ref 3.8–10.5)

## 2019-02-08 RX ORDER — CETIRIZINE HYDROCHLORIDE 10 MG/1
1 TABLET ORAL
Qty: 0 | Refills: 0 | COMMUNITY

## 2019-02-08 RX ORDER — PANTOPRAZOLE SODIUM 20 MG/1
40 TABLET, DELAYED RELEASE ORAL DAILY
Qty: 0 | Refills: 0 | Status: DISCONTINUED | OUTPATIENT
Start: 2019-02-08 | End: 2019-02-09

## 2019-02-08 RX ORDER — IBUPROFEN 200 MG
600 TABLET ORAL EVERY 6 HOURS
Qty: 0 | Refills: 0 | Status: DISCONTINUED | OUTPATIENT
Start: 2019-02-08 | End: 2019-02-09

## 2019-02-08 RX ORDER — ONDANSETRON 8 MG/1
8 TABLET, FILM COATED ORAL EVERY 8 HOURS
Qty: 0 | Refills: 0 | Status: DISCONTINUED | OUTPATIENT
Start: 2019-02-08 | End: 2019-02-09

## 2019-02-08 RX ORDER — ACETAMINOPHEN 500 MG
1 TABLET ORAL
Qty: 0 | Refills: 0 | COMMUNITY

## 2019-02-08 RX ORDER — IBUPROFEN 200 MG
1 TABLET ORAL
Qty: 0 | Refills: 0 | COMMUNITY
Start: 2019-02-08

## 2019-02-08 RX ADMIN — ENOXAPARIN SODIUM 40 MILLIGRAM(S): 100 INJECTION SUBCUTANEOUS at 09:32

## 2019-02-08 RX ADMIN — PANTOPRAZOLE SODIUM 40 MILLIGRAM(S): 20 TABLET, DELAYED RELEASE ORAL at 17:43

## 2019-02-08 RX ADMIN — Medication 600 MILLIGRAM(S): at 17:43

## 2019-02-08 RX ADMIN — Medication 30 MILLIGRAM(S): at 12:20

## 2019-02-08 RX ADMIN — Medication 975 MILLIGRAM(S): at 22:00

## 2019-02-08 RX ADMIN — Medication 62.5 MILLIMOLE(S): at 10:41

## 2019-02-08 RX ADMIN — Medication 30 MILLIGRAM(S): at 00:27

## 2019-02-08 RX ADMIN — Medication 975 MILLIGRAM(S): at 15:50

## 2019-02-08 RX ADMIN — Medication 30 MILLIGRAM(S): at 05:53

## 2019-02-08 RX ADMIN — Medication 100 MILLIGRAM(S): at 17:43

## 2019-02-08 RX ADMIN — Medication 600 MILLIGRAM(S): at 18:40

## 2019-02-08 RX ADMIN — Medication 30 MILLIGRAM(S): at 06:52

## 2019-02-08 RX ADMIN — Medication 100 MILLIGRAM(S): at 05:52

## 2019-02-08 RX ADMIN — Medication 975 MILLIGRAM(S): at 05:52

## 2019-02-08 RX ADMIN — Medication 975 MILLIGRAM(S): at 16:40

## 2019-02-08 RX ADMIN — ONDANSETRON 8 MILLIGRAM(S): 8 TABLET, FILM COATED ORAL at 05:52

## 2019-02-08 RX ADMIN — Medication 975 MILLIGRAM(S): at 23:00

## 2019-02-08 RX ADMIN — Medication 50 MICROGRAM(S): at 05:52

## 2019-02-08 RX ADMIN — Medication 30 MILLIGRAM(S): at 12:35

## 2019-02-08 RX ADMIN — ONDANSETRON 8 MILLIGRAM(S): 8 TABLET, FILM COATED ORAL at 15:50

## 2019-02-08 RX ADMIN — Medication 975 MILLIGRAM(S): at 06:52

## 2019-02-08 RX ADMIN — PANTOPRAZOLE SODIUM 40 MILLIGRAM(S): 20 TABLET, DELAYED RELEASE ORAL at 06:52

## 2019-02-08 NOTE — PROGRESS NOTE ADULT - ASSESSMENT
49 yo POD1 s/p ex-lap radical hysterectomy, BS, DAVID, cystoscopy, stable  Neuro- pain control w/ IV toradol, tylenol and oxycodone PRN, will d/c dilaudid PCA and add morphine PRN for breakthrough, approved for use of diclofenac topical patches per pain management doctor  CV - d/c IV fluids  Pulm - encourage incentive spirometer  GI - low residue diet   - d/c dunn, f/u TOV  DVT ppx - lovenox 40mg qd, encourage ambulation  AM labs pending

## 2019-02-08 NOTE — PROGRESS NOTE ADULT - ASSESSMENT
A/P: POD1 radical hysterectomy for enlarging fibroid uterus  1. FEN - lr@125, lrd as tolerated  2. PAIN - pca, toradol  3.  - dunn in place  4. RESP - IS  5. VTE prophylaxis - SCDs  6. LABS - am labs

## 2019-02-08 NOTE — PACU DISCHARGE NOTE - COMMENTS
Patient is s/p Ureteral stent placement, intraoperatively (not at tx)  02/07/2019     Cystoscopy  02/07/2019     Bilateral salpingectomy  02/07/2019    Radical hysterectomy  02/07/2019     Lysis of adhesions  02/07/2019    Exploratory laparotomy  02/07/2019        Patient is hemodynamically stable and meets PACU discharge criteria.  Report given to unit RN.  Patient transported via bed.  Unmonitored. Accompanied by transport team.

## 2019-02-08 NOTE — DISCHARGE NOTE ADULT - PLAN OF CARE
follow-up with Dr. Peña on 2/14 at 11:30 pt s/p radical hysterectomy, BS, DAVID, cystoscopy, stable for d/c home, f/u as instructed, return to ED if fever >101, heavy vaginal bleeding, severe abdominal pain follow-up with your pain doctor take pain medications as instructed by your pain doctor

## 2019-02-08 NOTE — PROGRESS NOTE ADULT - ASSESSMENT
49 yo POD1 s/p ex-lap radical hysterectomy, BS, DAVID, cystoscopy, stable  Neuro- pain control w/ IV motrin, tylenol and oxycodone PRN, approved for use of diclofenac topical patches per pain management doctor  CV - hemodynamically stable, saline lock  Pulm - encourage incentive spirometer  GI - low residue diet   - dunn to stay in for 1 week given extent of surgery  DVT ppx - lovenox 40mg qd, encourage ambulation  AM labs reviewed- phosphorus repleted, labs ordered for 2/9

## 2019-02-08 NOTE — DISCHARGE NOTE ADULT - MEDICATION SUMMARY - MEDICATIONS TO TAKE
I will START or STAY ON the medications listed below when I get home from the hospital:    Norco 10 mg-325 mg oral tablet  -- 1 tab(s) by mouth every 6 hours  -- Indication: For Pain    Ecotrin Adult Low Strength 81 mg oral delayed release tablet  -- 1 tab(s) by mouth once a day  -- Indication: For CAD (coronary artery disease)    ibuprofen 600 mg oral tablet  -- 1 tab(s) by mouth every 6 hours  -- Indication: For Pain    Synthroid 50 mcg (0.05 mg) oral tablet  -- 1 tab(s) by mouth once a day  -- Indication: For Hypothyroid

## 2019-02-08 NOTE — PROGRESS NOTE ADULT - ASSESSMENT
47 yo POD1 s/p ex-lap radical hysterectomy, BS, DAVID, cystoscopy (2/7)    Impression:  - vss and wnr, pain well-controlled; tolerating reg diet, +F/-BM  - incision clean, nonerythematous and abdominal exam benign    Recs:   - codey for bowel function  - encourage oob/ambulation  - appreciate care per primary team 49 yo POD1 s/p ex-lap radical hysterectomy, BS, DAVID, cystoscopy (2/7)    Impression:  - vss and wnr, pain well-controlled; tolerating reg diet, +F/-BM  - incision clean, nonerythematous and abdominal exam benign    Recs:   - cont. codey bowel function  - encourage oob/ambulation  - appreciate care per primary team 49 yo POD1 s/p ex-lap radical hysterectomy, BS, DAVID, cystoscopy (2/7)    Impression:  - vss and wnr, pain well-controlled; tolerating reg diet, +F/-BM  - incision clean, nonerythematous and abdominal exam benign    Recs:   - cont. codey bowel function  - encourage oob/ambulation  - appreciate care per primary team  - will cont. to follow

## 2019-02-08 NOTE — DISCHARGE NOTE ADULT - PATIENT PORTAL LINK FT
You can access the [email or web address] Patient Portal, offered by [facility], by registering with the following website: [email or web address]

## 2019-02-08 NOTE — DISCHARGE NOTE ADULT - CARE PLAN
Principal Discharge DX:	Uterine mass  Goal:	follow-up with Dr. Peña on 2/14 at 11:30  Assessment and plan of treatment:	pt s/p radical hysterectomy, BS, DAVID, cystoscopy, stable for d/c home, f/u as instructed, return to ED if fever >101, heavy vaginal bleeding, severe abdominal pain  Secondary Diagnosis:	Chronic pain  Goal:	follow-up with your pain doctor  Assessment and plan of treatment:	take pain medications as instructed by your pain doctor

## 2019-02-08 NOTE — DISCHARGE NOTE ADULT - CARE PROVIDER_API CALL
Hillary Peña (DO)  Gynecologic Oncology; Obstetrics and Gynecology  [address] [place] [address]  Phone: [phone]  Fax: [phone]  Follow Up Time:

## 2019-02-09 VITALS
OXYGEN SATURATION: 98 % | SYSTOLIC BLOOD PRESSURE: 130 MMHG | RESPIRATION RATE: 15 BRPM | HEART RATE: 97 BPM | TEMPERATURE: 99 F

## 2019-02-09 LAB
ANION GAP SERPL CALC-SCNC: 8 MMOL/L — SIGNIFICANT CHANGE UP (ref 5–17)
BUN SERPL-MCNC: 10 MG/DL — SIGNIFICANT CHANGE UP (ref 7–23)
CALCIUM SERPL-MCNC: 8.3 MG/DL — LOW (ref 8.4–10.5)
CHLORIDE SERPL-SCNC: 107 MMOL/L — SIGNIFICANT CHANGE UP (ref 96–108)
CO2 SERPL-SCNC: 24 MMOL/L — SIGNIFICANT CHANGE UP (ref 22–31)
CREAT SERPL-MCNC: 0.78 MG/DL — SIGNIFICANT CHANGE UP (ref 0.5–1.3)
GLUCOSE SERPL-MCNC: 121 MG/DL — HIGH (ref 70–99)
HCT VFR BLD CALC: 29.1 % — LOW (ref 34.5–45)
HGB BLD-MCNC: 9.3 G/DL — LOW (ref 11.5–15.5)
MAGNESIUM SERPL-MCNC: 2.2 MG/DL — SIGNIFICANT CHANGE UP (ref 1.6–2.6)
MCHC RBC-ENTMCNC: 30 PG — SIGNIFICANT CHANGE UP (ref 27–34)
MCHC RBC-ENTMCNC: 32 GM/DL — SIGNIFICANT CHANGE UP (ref 32–36)
MCV RBC AUTO: 93.9 FL — SIGNIFICANT CHANGE UP (ref 80–100)
NRBC # BLD: 0 /100 WBCS — SIGNIFICANT CHANGE UP (ref 0–0)
PHOSPHATE SERPL-MCNC: 2.7 MG/DL — SIGNIFICANT CHANGE UP (ref 2.5–4.5)
PLATELET # BLD AUTO: 224 K/UL — SIGNIFICANT CHANGE UP (ref 150–400)
POTASSIUM SERPL-MCNC: 4.1 MMOL/L — SIGNIFICANT CHANGE UP (ref 3.5–5.3)
POTASSIUM SERPL-SCNC: 4.1 MMOL/L — SIGNIFICANT CHANGE UP (ref 3.5–5.3)
RBC # BLD: 3.1 M/UL — LOW (ref 3.8–5.2)
RBC # FLD: 14 % — SIGNIFICANT CHANGE UP (ref 10.3–14.5)
SODIUM SERPL-SCNC: 139 MMOL/L — SIGNIFICANT CHANGE UP (ref 135–145)
WBC # BLD: 9.5 K/UL — SIGNIFICANT CHANGE UP (ref 3.8–10.5)
WBC # FLD AUTO: 9.5 K/UL — SIGNIFICANT CHANGE UP (ref 3.8–10.5)

## 2019-02-09 RX ADMIN — Medication 600 MILLIGRAM(S): at 00:57

## 2019-02-09 RX ADMIN — Medication 100 MILLIGRAM(S): at 06:48

## 2019-02-09 RX ADMIN — Medication 50 MICROGRAM(S): at 05:09

## 2019-02-09 RX ADMIN — ENOXAPARIN SODIUM 40 MILLIGRAM(S): 100 INJECTION SUBCUTANEOUS at 08:20

## 2019-02-09 NOTE — PROGRESS NOTE ADULT - REASON FOR ADMISSION
postoperative observation

## 2019-02-09 NOTE — PROGRESS NOTE ADULT - SUBJECTIVE AND OBJECTIVE BOX
Pt evaluated at bedside.  She reports pain is well controlled with tylenol, motrin, oxycodone PRN and her diclofenac patch.  She denies fever/chills, HA, dizziness, SOB, CP, palpitations, n/v. She is ambulating out of bed w/o assistance, passing gas and tolerating low residue diet.    T(C): 36.9 (02-08-19 @ 16:09), Max: 37.5 (02-08-19 @ 05:22)  HR: 118 (02-08-19 @ 16:09) (94 - 118)  BP: 137/74 (02-08-19 @ 16:09) (134/81 - 137/81)  RR: 18 (02-08-19 @ 16:09) (15 - 18)  SpO2: 97% (02-08-19 @ 16:09) (94% - 97%)  GA: NAD, A+OX3  CV: RRR, no MRG  Pulm: CTAB  Abd: +BS, soft, obese, NTND, no rebound or guarding, incision c/d/i w/o surrounding erythema or discharge  Extrem: no calf tenderness    02-07 @ 07:01  -  02-08 @ 07:00  --------------------------------------------------------  IN: 500 mL / OUT: 925 mL / NET: -425 mL    02-08 @ 07:01  -  02-08 @ 16:22  --------------------------------------------------------  IN: 840 mL / OUT: 3100 mL / NET: -2260 mL                        10.7   11.52 )-----------( 256      ( 08 Feb 2019 07:05 )             33.9     02-08    138  |  105  |  11  ----------------------------<  156<H>  4.6   |  21<L>  |  0.68    Ca    9.0      08 Feb 2019 07:05  Phos  2.2     02-08  Mg     1.9     02-08    MEDICATIONS  (STANDING):  acetaminophen   Tablet .. 975 milliGRAM(s) Oral every 8 hours  BUpivacaine liposome 1.3% Injectable 20 milliLiter(s) Local Injection once  docusate sodium 100 milliGRAM(s) Oral two times a day  enoxaparin Injectable 40 milliGRAM(s) SubCutaneous every 24 hours  ibuprofen  Tablet. 600 milliGRAM(s) Oral every 6 hours  levothyroxine 50 MICROGram(s) Oral daily  pantoprazole    Tablet 40 milliGRAM(s) Oral daily    MEDICATIONS  (PRN):  ondansetron    Tablet 8 milliGRAM(s) Oral every 8 hours PRN Nausea and/or Vomiting  oxyCODONE    IR 5 milliGRAM(s) Oral every 4 hours PRN Mild Pain (1 - 3)  oxyCODONE    IR 10 milliGRAM(s) Oral every 6 hours PRN Moderate Pain (4 - 6)  simethicone 80 milliGRAM(s) Chew every 6 hours PRN Gas
GYN PROGRESS NOTE PGY4    Patient evaluated at the bedside. No acute events.  Denies CP/SOB/dizziness/nausea/vomiting/abdominal pain/calf pain.  Pain well controlled on PCA. Not OOB. No flatus. Tolerating clears diet.  Adequate UOP in dunn with blood stained urine.    T(C): 36.2 (02-08-19 @ 00:41), Max: 36.6 (02-07-19 @ 20:31)  HR: 76 (02-07-19 @ 23:46) (76 - 92)  BP: 132/66 (02-07-19 @ 23:46) (113/58 - 132/66)  RR: 14 (02-07-19 @ 23:46) (10 - 21)  SpO2: 97% (02-07-19 @ 23:46) (96% - 100%)    GEN: patient appears well  LUNGS: no respiratory distress  ABD: soft, non tender, non distended, dressing in place  EXT: no calf tenderness, scd's        02-07 @ 07:01  -  02-08 @ 01:01  --------------------------------------------------------  IN: 500 mL / OUT: 175 mL / NET: 325 mL
Pt evaluated at bedside, reports feeling well and was comfortable overnight.  She denies fever/chills, HA, dizziness, SOB, CP, palpitations, n/v, abdominal pain, heavy vaginal bleeding.  She has not ambulated out of bed since surgery and has not passed gas.    T(C): 37.5 (02-08-19 @ 05:22), Max: 37.6 (02-08-19 @ 01:20)  HR: 94 (02-08-19 @ 05:22) (76 - 94)  BP: 137/81 (02-08-19 @ 05:22) (113/58 - 137/81)  RR: 18 (02-08-19 @ 05:22) (10 - 21)  SpO2: 94% (02-08-19 @ 05:22) (94% - 100%)  GA: NAD, A+OX3  CV: RRR, no MRG  Pulm: CTAB  Abd: +BS, soft, obese, NTND, no rebound or guarding, dressing removed, incision c/d/i  Extrem: SCDs in place, no calf tenderness    02-07 @ 07:01  -  02-08 @ 06:25  --------------------------------------------------------  IN: 500 mL / OUT: 925 mL / NET: -425 mL                          11.1   12.03 )-----------( 251      ( 07 Feb 2019 20:48 )             34.7     02-07    137  |  105  |  13  ----------------------------<  190<H>  4.0   |  20<L>  |  0.66    Ca    8.2<L>      07 Feb 2019 20:48    MEDICATIONS  (STANDING):  acetaminophen   Tablet .. 975 milliGRAM(s) Oral every 8 hours  BUpivacaine liposome 1.3% Injectable 20 milliLiter(s) Local Injection once  docusate sodium 100 milliGRAM(s) Oral two times a day  enoxaparin Injectable 40 milliGRAM(s) SubCutaneous every 24 hours  ketorolac   Injectable 30 milliGRAM(s) IV Push every 6 hours  levothyroxine 50 MICROGram(s) Oral daily  ondansetron    Tablet 8 milliGRAM(s) Oral every 8 hours  pantoprazole   Suspension 40 milliGRAM(s) Oral before breakfast    MEDICATIONS  (PRN):  metoclopramide Injectable 10 milliGRAM(s) IV Push once PRN nausea unrelieved by zofran  naloxone Injectable 0.1 milliGRAM(s) IV Push every 3 minutes PRN For ANY of the following changes in patient status:  A. RR LESS THAN 10 breaths per minute, B. Oxygen saturation LESS THAN 90%, C. Sedation score of 6  oxyCODONE    IR 5 milliGRAM(s) Oral every 4 hours PRN Mild Pain (1 - 3)  oxyCODONE    IR 10 milliGRAM(s) Oral every 6 hours PRN Moderate Pain (4 - 6)  simethicone 80 milliGRAM(s) Chew every 6 hours PRN Gas
Pt evaluated at bedside.  She reports pain is well controlled. tolerating regular diet, passing flatus, ambulating, voiding spontaneously.    T(C): 37.2 (02-09-19 @ 08:40), Max: 37.2 (02-09-19 @ 08:40)  HR: 97 (02-09-19 @ 08:40) (97 - 97)  BP: 130/- (02-09-19 @ 08:40) (130/- - 130/-)  RR: 15 (02-09-19 @ 08:40) (15 - 15)  SpO2: 98% (02-09-19 @ 08:40) (98% - 98%)  GA: NAD  Pulm: nonlabored breathing  Abd: +BS, soft, obese, NTND, midline vertical incision c/d/i, no rebound or guarding  Extrem: no calf tenderness bilaterally    02-08 @ 07:01  -  02-09 @ 07:00  --------------------------------------------------------  IN: 1410 mL / OUT: 5850 mL / NET: -4440 mL                              9.3    9.50  )-----------( 224      ( 09 Feb 2019 06:42 )             29.1     02-09    139  |  107  |  10  ----------------------------<  121<H>  4.1   |  24  |  0.78    Ca    8.3<L>      09 Feb 2019 06:42  Phos  2.7     02-09  Mg     2.2     02-09
SUBJECTIVE:   Patient seen and examined by bedside. Doing well POD s/p DAVID, SIS-BSO 2/7. Gen surg was following intraoperatively due to concern for ventral hernia; no hernia appreciated. Patient tolerating reg diet for breakfast this AM and denies nausea. Tolerating diet, + flatus, but no bowel movement. Will ambulate today. Pain well-controlled, and reports minimal incisional pain.      enoxaparin Injectable 40 milliGRAM(s) SubCutaneous every 24 hours      Vital Signs Last 24 Hrs  T(C): 36.9 (08 Feb 2019 09:08), Max: 37.6 (08 Feb 2019 01:20)  T(F): 98.4 (08 Feb 2019 09:08), Max: 99.6 (08 Feb 2019 01:20)  HR: 100 (08 Feb 2019 09:08) (76 - 100)  BP: 134/81 (08 Feb 2019 09:08) (113/58 - 137/81)  BP(mean): 84 (07 Feb 2019 23:46) (71 - 94)  RR: 15 (08 Feb 2019 09:08) (10 - 21)  SpO2: 94% (08 Feb 2019 09:08) (94% - 100%)  I&O's Detail    07 Feb 2019 07:01  -  08 Feb 2019 07:00  --------------------------------------------------------  IN:    lactated ringers.: 500 mL  Total IN: 500 mL    OUT:    Indwelling Catheter - Urethral: 925 mL  Total OUT: 925 mL    Total NET: -425 mL      08 Feb 2019 07:01  -  08 Feb 2019 10:49  --------------------------------------------------------  IN:    Oral Fluid: 480 mL  Total IN: 480 mL    OUT:  Total OUT: 0 mL    Total NET: 480 mL            Physical Exam  General: NAD, alert, interactive, appears comfortable, obese  Pulm: Nonlabored breathing, no respiratory distress  Abd: softly distended, midline incision clean, non erythematous, nontender to palpation  : dunn in place  Extrem: WWP, 1+ pitting edema bilaterally, SCDs in place        LABS:                        10.7   11.52 )-----------( 256      ( 08 Feb 2019 07:05 )             33.9     02-08    138  |  105  |  11  ----------------------------<  156<H>  4.6   |  21<L>  |  0.68    Ca    9.0      08 Feb 2019 07:05  Phos  2.2     02-08  Mg     1.9     02-08        Urinalysis Basic - ( 07 Feb 2019 15:36 )    Color: Yellow / Appearance: Clear / SG: >=1.030 / pH: x  Gluc: x / Ketone: Trace mg/dL  / Bili: Negative / Urobili: 0.2 E.U./dL   Blood: x / Protein: NEGATIVE mg/dL / Nitrite: NEGATIVE   Leuk Esterase: NEGATIVE / RBC: x / WBC x   Sq Epi: x / Non Sq Epi: x / Bacteria: x        RADIOLOGY & ADDITIONAL STUDIES:

## 2019-02-09 NOTE — PROGRESS NOTE ADULT - ASSESSMENT
47 yo POD2 s/p ex-lap radical hysterectomy, BS, DAVID, cystoscopy, stable  Neuro- pain control w/ IV motrin, tylenol and oxycodone PRN, approved for use of diclofenac topical patches per pain management doctor  CV - hemodynamically stable, saline lock  Pulm - encourage incentive spirometer  GI - low residue diet   - dunn to stay in for 1 week given extent of surgery  DVT ppx - lovenox 40mg qd, encourage ambulation

## 2019-02-14 ENCOUNTER — APPOINTMENT (OUTPATIENT)
Dept: GYNECOLOGIC ONCOLOGY | Facility: CLINIC | Age: 49
End: 2019-02-14
Payer: MEDICARE

## 2019-02-14 VITALS
HEIGHT: 64 IN | BODY MASS INDEX: 36.54 KG/M2 | OXYGEN SATURATION: 99 % | SYSTOLIC BLOOD PRESSURE: 123 MMHG | WEIGHT: 214 LBS | HEART RATE: 115 BPM | DIASTOLIC BLOOD PRESSURE: 76 MMHG

## 2019-02-14 PROBLEM — D25.9 LEIOMYOMA OF UTERUS, UNSPECIFIED: Chronic | Status: ACTIVE | Noted: 2019-02-06

## 2019-02-14 PROBLEM — E88.81 METABOLIC SYNDROME AND OTHER INSULIN RESISTANCE: Chronic | Status: ACTIVE | Noted: 2019-02-06

## 2019-02-14 PROBLEM — R00.2 PALPITATIONS: Chronic | Status: ACTIVE | Noted: 2019-02-06

## 2019-02-14 PROBLEM — E78.5 HYPERLIPIDEMIA, UNSPECIFIED: Chronic | Status: ACTIVE | Noted: 2019-02-06

## 2019-02-14 PROBLEM — F41.9 ANXIETY DISORDER, UNSPECIFIED: Chronic | Status: ACTIVE | Noted: 2019-02-06

## 2019-02-14 PROBLEM — I25.10 ATHEROSCLEROTIC HEART DISEASE OF NATIVE CORONARY ARTERY WITHOUT ANGINA PECTORIS: Chronic | Status: ACTIVE | Noted: 2019-02-06

## 2019-02-14 PROBLEM — K21.9 GASTRO-ESOPHAGEAL REFLUX DISEASE WITHOUT ESOPHAGITIS: Chronic | Status: ACTIVE | Noted: 2019-02-06

## 2019-02-14 PROBLEM — E07.9 DISORDER OF THYROID, UNSPECIFIED: Chronic | Status: ACTIVE | Noted: 2019-02-06

## 2019-02-14 LAB — SURGICAL PATHOLOGY STUDY: SIGNIFICANT CHANGE UP

## 2019-02-14 PROCEDURE — 99024 POSTOP FOLLOW-UP VISIT: CPT

## 2019-02-15 NOTE — PAST MEDICAL HISTORY
[Menstruating] : The patient is menstruating [Definite ___ (Date)] : the last menstrual period was [unfilled] [Normal Amount/Duration] : it was of a normal amount and duration [Regular Cycle Intervals] : have been regular [Total Preg ___] : G[unfilled] [Abortions ___] : Abortions:[unfilled]

## 2019-02-21 DIAGNOSIS — K21.9 GASTRO-ESOPHAGEAL REFLUX DISEASE WITHOUT ESOPHAGITIS: ICD-10-CM

## 2019-02-21 DIAGNOSIS — Z79.82 LONG TERM (CURRENT) USE OF ASPIRIN: ICD-10-CM

## 2019-02-21 DIAGNOSIS — N73.6 FEMALE PELVIC PERITONEAL ADHESIONS (POSTINFECTIVE): ICD-10-CM

## 2019-02-21 DIAGNOSIS — Z91.018 ALLERGY TO OTHER FOODS: ICD-10-CM

## 2019-02-21 DIAGNOSIS — Z88.0 ALLERGY STATUS TO PENICILLIN: ICD-10-CM

## 2019-02-21 DIAGNOSIS — Z96.9 PRESENCE OF FUNCTIONAL IMPLANT, UNSPECIFIED: ICD-10-CM

## 2019-02-21 DIAGNOSIS — E07.9 DISORDER OF THYROID, UNSPECIFIED: ICD-10-CM

## 2019-02-21 DIAGNOSIS — Z28.21 IMMUNIZATION NOT CARRIED OUT BECAUSE OF PATIENT REFUSAL: ICD-10-CM

## 2019-02-21 DIAGNOSIS — K43.2 INCISIONAL HERNIA WITHOUT OBSTRUCTION OR GANGRENE: ICD-10-CM

## 2019-02-21 DIAGNOSIS — Z95.5 PRESENCE OF CORONARY ANGIOPLASTY IMPLANT AND GRAFT: ICD-10-CM

## 2019-02-21 DIAGNOSIS — E88.81 METABOLIC SYNDROME AND OTHER INSULIN RESISTANCE: ICD-10-CM

## 2019-02-21 DIAGNOSIS — K42.9 UMBILICAL HERNIA WITHOUT OBSTRUCTION OR GANGRENE: ICD-10-CM

## 2019-02-21 DIAGNOSIS — D25.9 LEIOMYOMA OF UTERUS, UNSPECIFIED: ICD-10-CM

## 2019-02-21 DIAGNOSIS — F41.9 ANXIETY DISORDER, UNSPECIFIED: ICD-10-CM

## 2019-02-21 DIAGNOSIS — I25.10 ATHEROSCLEROTIC HEART DISEASE OF NATIVE CORONARY ARTERY WITHOUT ANGINA PECTORIS: ICD-10-CM

## 2019-02-21 DIAGNOSIS — Z88.1 ALLERGY STATUS TO OTHER ANTIBIOTIC AGENTS STATUS: ICD-10-CM

## 2019-02-26 PROCEDURE — 84100 ASSAY OF PHOSPHORUS: CPT

## 2019-02-26 PROCEDURE — 85027 COMPLETE CBC AUTOMATED: CPT

## 2019-02-26 PROCEDURE — 86900 BLOOD TYPING SEROLOGIC ABO: CPT

## 2019-02-26 PROCEDURE — C1758: CPT

## 2019-02-26 PROCEDURE — 81003 URINALYSIS AUTO W/O SCOPE: CPT

## 2019-02-26 PROCEDURE — 36415 COLL VENOUS BLD VENIPUNCTURE: CPT

## 2019-02-26 PROCEDURE — 86901 BLOOD TYPING SEROLOGIC RH(D): CPT

## 2019-02-26 PROCEDURE — 86850 RBC ANTIBODY SCREEN: CPT

## 2019-02-26 PROCEDURE — 80048 BASIC METABOLIC PNL TOTAL CA: CPT

## 2019-02-26 PROCEDURE — C1769: CPT

## 2019-02-26 PROCEDURE — 82962 GLUCOSE BLOOD TEST: CPT

## 2019-02-26 PROCEDURE — 83735 ASSAY OF MAGNESIUM: CPT

## 2019-02-26 PROCEDURE — 88307 TISSUE EXAM BY PATHOLOGIST: CPT

## 2019-03-06 ENCOUNTER — APPOINTMENT (OUTPATIENT)
Dept: GYNECOLOGIC ONCOLOGY | Facility: CLINIC | Age: 49
End: 2019-03-06
Payer: MEDICARE

## 2019-03-06 VITALS
BODY MASS INDEX: 36.54 KG/M2 | HEART RATE: 96 BPM | SYSTOLIC BLOOD PRESSURE: 125 MMHG | HEIGHT: 64 IN | DIASTOLIC BLOOD PRESSURE: 84 MMHG | WEIGHT: 214 LBS | OXYGEN SATURATION: 99 %

## 2019-03-06 DIAGNOSIS — D25.9 LEIOMYOMA OF UTERUS, UNSPECIFIED: ICD-10-CM

## 2019-03-06 PROCEDURE — 99024 POSTOP FOLLOW-UP VISIT: CPT

## 2019-03-07 NOTE — REVIEW OF SYSTEMS
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

## 2019-03-07 NOTE — PHYSICAL EXAM
[Normal] : Vagina: Normal [Absent] : Adnexa(ae): Absent [Fully active, able to carry on all pre-disease performance without restriction] : Status 0 - Fully active, able to carry on all pre-disease performance without restriction [de-identified] : obese and large abdominal mass nabove umbilicus and incision above umbilicus, healing midline incision [de-identified] : s/p hysterectomy [de-identified] : s/o bilateral salpingectomy

## 2019-03-07 NOTE — DISCUSSION/SUMMARY
[Reviewed Clinical Lab Test(s)] : Results of clinical tests were reviewed. [Reviewed Radiology Report(s)] : Radiology reports were reviewed. [Discuss Tests w/Referring Providers] : Results of labs/radiology studies and the treatment recommendations were discussed with performing/referring physician. [Discuss Alternatives/Risks/Benefits w/Patient] : All alternatives, risks, and benefits were discussed with the patient/family and all questions were answered.  Patient expressed good understanding and appreciates the importance of follow up as recommended. [Pre Op] : The differential diagnosis was discussed in detail. The indications, risks, benefits and alternatives were discussed. [unfilled] expressed an understanding of the treatment rationale and her questions were answered to her apparent satisfaction. [FreeTextEntry2] : large uterine mass enlarging: cannot rule out sarcoma [FreeTextEntry1] : Excellent 1 month post op recovery\par Patient may continue routine GYN care with Dr. Finkelstein

## 2019-03-07 NOTE — HISTORY OF PRESENT ILLNESS
[FreeTextEntry1] : Problem\par 1)MRI strongly concerning for sarcoma \par \par Previous Therapy\par 1)MRI 5/28/14\par    a)Uterus of 8.9cm\par    b)Endometrial stripe is 6mm\par    c)Lesion in fundal uterus of 2.2cm in thickness\par    d)Fibroid of 2.4cm, previously 2.8cm in 2007\par    e)B/l ovaries WDL\par    f)No pelvic adenopathy\par 2)EMB 11/30/18\par    a)Superficial fragment o proliferative endometrium, fragment of endometrial polyp and tissue from lower uterine segment \par 3)MRI Pelvis 12/11/18\par    a)Uterus distorted with loss of zonal anatomy markedly enhancing and relatively homogenous mass measuring 12.9x21.6x24.6cm\par    b)Lesion replaces the previously described myoma and effaces the endometrium and junctional zone. \par    c)No visualization of b/l ovaries due to compression of mass \par    d)No adenopathy of ascites \par 4)CT CHEST 12/15/18\par    a)WDL, fatty liver \par 5)Exploratory laparotomy total hysterectomy bilateral salpingectomy 2/7/19\par    a)Benign leiomyoma, benign secretory endometrium, benign bilateral fallopian tubes \par \par Presents today for her 1 month post-op appointment. Feeling well.

## 2019-03-07 NOTE — PHYSICAL EXAM
[Normal] : No focal neurologic defects observed [Absent] : Adnexa(ae): Absent [Fully active, able to carry on all pre-disease performance without restriction] : Status 0 - Fully active, able to carry on all pre-disease performance without restriction [de-identified] : obese and large abdominal mass nabove umbilicus and incision above umbilicus, healing midline incision [de-identified] : s/p hysterectomy [de-identified] : s/o bilateral salpingectomy

## 2019-03-07 NOTE — REVIEW OF SYSTEMS
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

## 2019-03-07 NOTE — DISCUSSION/SUMMARY
[Reviewed Clinical Lab Test(s)] : Results of clinical tests were reviewed. [Reviewed Radiology Report(s)] : Radiology reports were reviewed. [Discuss Tests w/Referring Providers] : Results of labs/radiology studies and the treatment recommendations were discussed with performing/referring physician. [Discuss Alternatives/Risks/Benefits w/Patient] : All alternatives, risks, and benefits were discussed with the patient/family and all questions were answered.  Patient expressed good understanding and appreciates the importance of follow up as recommended. [Pre Op] : The differential diagnosis was discussed in detail. The indications, risks, benefits and alternatives were discussed. [unfilled] expressed an understanding of the treatment rationale and her questions were answered to her apparent satisfaction. [FreeTextEntry2] : large uterine mass enlarging: cannot rule out sarcoma [FreeTextEntry1] : \par Excellent 1 week post op recovery\par Benign pathology reviewed in detail\par indwelling urinary catheter removed\par Patient passed TOV\par Will follow up with Dr. Peña in 3 weeks from today

## 2019-03-07 NOTE — HISTORY OF PRESENT ILLNESS
[FreeTextEntry1] : Problem\par 1)MRI strongly concerning for sarcoma \par \par Previous Therapy\par 1)MRI 5/28/14\par    a)Uterus of 8.9cm\par    b)Endometrial stripe is 6mm\par    c)Lesion in fundal uterus of 2.2cm in thickness\par    d)Fibroid of 2.4cm, previously 2.8cm in 2007\par    e)B/l ovaries WDL\par    f)No pelvic adenopathy\par 2)EMB 11/30/18\par    a)Superficial fragment o proliferative endometrium, fragment of endometrial polyp and tissue from lower uterine segment \par 3)MRI Pelvis 12/11/18\par    a)Uterus distorted with loss of zonal anatomy markedly enhancing and relatively homogenous mass measuring 12.9x21.6x24.6cm\par    b)Lesion replaces the previously described myoma and effaces the endometrium and junctional zone. \par    c)No visualization of b/l ovaries due to compression of mass \par    d)No adenopathy of ascites \par 4)CT CHEST 12/15/18\par    a)WDL, fatty liver \par 5)Exploratory laparotomy total hysterectomy bilateral salpingectomy 2/7/19\par    a)Benign leiomyoma, benign secretory endometrium, benign bilateral fallopian tubes \par \par Presents today for her 1 week post-op appointment. feeling well. States pain is well controlled, GI normal,  normal with indwelling urinary catheter to be removed today and undergo trial of void.

## 2019-03-08 PROBLEM — D25.9 UTERINE FIBROID: Status: ACTIVE | Noted: 2019-01-22

## 2019-03-27 ENCOUNTER — APPOINTMENT (OUTPATIENT)
Dept: ENDOCRINOLOGY | Facility: CLINIC | Age: 49
End: 2019-03-27
Payer: MEDICARE

## 2019-03-27 VITALS
DIASTOLIC BLOOD PRESSURE: 98 MMHG | HEART RATE: 112 BPM | HEIGHT: 64 IN | BODY MASS INDEX: 37.05 KG/M2 | SYSTOLIC BLOOD PRESSURE: 137 MMHG | WEIGHT: 217 LBS

## 2019-03-27 PROCEDURE — 99204 OFFICE O/P NEW MOD 45 MIN: CPT

## 2019-03-27 RX ORDER — RANITIDINE 300 MG/1
300 TABLET ORAL
Qty: 90 | Refills: 0 | Status: DISCONTINUED | COMMUNITY
Start: 2017-09-30 | End: 2019-03-27

## 2019-03-27 RX ORDER — FLUTICASONE PROPIONATE 50 UG/1
50 SPRAY, METERED NASAL DAILY
Qty: 1 | Refills: 2 | Status: DISCONTINUED | COMMUNITY
Start: 2017-04-10 | End: 2019-03-27

## 2019-03-27 RX ORDER — LOPERAMIDE HYDROCHLORIDE 2 MG/1
2 CAPSULE ORAL
Qty: 15 | Refills: 0 | Status: DISCONTINUED | COMMUNITY
Start: 2017-03-23 | End: 2019-03-27

## 2019-03-27 RX ORDER — ACETAMINOPHEN 325 MG/1
TABLET, FILM COATED ORAL
Refills: 0 | Status: ACTIVE | COMMUNITY

## 2019-03-27 RX ORDER — NAPROXEN 500 MG/1
TABLET ORAL
Refills: 0 | Status: ACTIVE | COMMUNITY

## 2019-03-27 RX ORDER — LEVOFLOXACIN 500 MG/1
500 TABLET, FILM COATED ORAL DAILY
Qty: 5 | Refills: 0 | Status: DISCONTINUED | COMMUNITY
Start: 2017-02-23 | End: 2019-03-27

## 2019-03-27 RX ORDER — CLINDAMYCIN PHOSPHATE 10 MG/ML
1 SOLUTION TOPICAL
Qty: 60 | Refills: 0 | Status: DISCONTINUED | COMMUNITY
Start: 2017-02-17 | End: 2019-03-27

## 2019-03-27 RX ORDER — LEVOFLOXACIN 750 MG/1
750 TABLET, FILM COATED ORAL
Qty: 5 | Refills: 0 | Status: DISCONTINUED | COMMUNITY
Start: 2017-05-02 | End: 2019-03-27

## 2019-03-29 LAB — TSH SERPL-ACNC: 2.94 UIU/ML

## 2019-04-02 ENCOUNTER — RX RENEWAL (OUTPATIENT)
Age: 49
End: 2019-04-02

## 2019-04-02 NOTE — HISTORY OF PRESENT ILLNESS
[FreeTextEntry1] : Ms. Gomez is a 48 year-old woman with a history of multiple medical problems including coronary artery disease status post stent in 2016, brain injury in 2015 with memory issues, hypothyroidism, nontoxic multinodular goiter, elevated HbA1c presenting to establish care with me for hypothyroidism. She is referred by Dr. Mal Lyons, who follows her nontoxic multinodular goiter.\par \par Hypothyroidism.\par She was diagnosed with hypothyroidism around 2012.\par She has been on Synthroid (brand-name) 50 mcg daily since that time.\par She is taking omeprazole first in the morning, on an empty stomach, then is taking levothyroxine about an hour later, with plain water, and waiting at least 30 minutes before eating. She is not taking calcium/iron/multivitamin. She has been on omeprazole for about five years.\par No history of radiation exposure.\par Her mother has a history of hypothyroidism and nontoxic multinodular goiter, and maternal grandmother with goiter.\par \par She is status post partial hysterectomy 7 weeks ago for fibroids. She feels tired but but overall doing well. She has issues with hair cracking and falling out. She has dry skin on her feet and issues with her nails. She has occasional lower extremity swelling.

## 2019-04-02 NOTE — ADDENDUM
[FreeTextEntry1] : TSH within range as below. I will send a letter with Ms. Gomez to refer to dermatology. She is scheduled to see nutrition and for follow-up with me. 3/29/19\par \par I spoke with MsKrish Patricia about her results and plan. 4/02/19

## 2019-04-02 NOTE — PHYSICAL EXAM
[Alert] : alert [No Acute Distress] : no acute distress [Healthy Appearance] : healthy appearance [Normal Sclera/Conjunctiva] : normal sclera/conjunctiva [Normal Oropharynx] : the oropharynx was normal [No Neck Mass] : no neck mass was observed [Supple] : the neck was supple [No LAD] : no lymphadenopathy [Thyroid Not Enlarged] : the thyroid was not enlarged [No Thyroid Nodules] : there were no palpable thyroid nodules [Normal Rate and Effort] : normal respiratory rhythm and effort [Clear to Auscultation] : lungs were clear to auscultation bilaterally [Normal Rate] : heart rate was normal  [Normal S1, S2] : normal S1 and S2 [Regular Rhythm] : with a regular rhythm [No Stigmata of Cushings Syndrome] : no stigmata of cushings syndrome [Normal Gait] : normal gait [Normal Insight/Judgement] : insight and judgment were intact [Kyphosis] : no kyphosis present [Acanthosis Nigricans] : no acanthosis nigricans [de-identified] : no moon facies, no supraclavicular fat pads

## 2019-04-02 NOTE — ASSESSMENT
[FreeTextEntry1] : Hypothyroidism. She has some symptoms that may be attributable to thyroid disease. We will check TSH and titrate levothyroxine as needed. We reviewed proper use and compliance with levothyroxine. If she is euthyroid, we will refer to dermatology for hair/nail changes.\par \par Elevated HbA1c. Discuss next visit.\par \par Return to clinic in 3 months.

## 2019-04-04 DIAGNOSIS — R53.81 OTHER MALAISE: ICD-10-CM

## 2019-04-30 ENCOUNTER — APPOINTMENT (OUTPATIENT)
Dept: ENDOCRINOLOGY | Facility: CLINIC | Age: 49
End: 2019-04-30
Payer: MEDICARE

## 2019-04-30 VITALS — WEIGHT: 219 LBS | BODY MASS INDEX: 37.59 KG/M2

## 2019-04-30 PROCEDURE — 97802 MEDICAL NUTRITION INDIV IN: CPT

## 2019-06-25 ENCOUNTER — APPOINTMENT (OUTPATIENT)
Dept: ENDOCRINOLOGY | Facility: CLINIC | Age: 49
End: 2019-06-25

## 2019-07-11 ENCOUNTER — APPOINTMENT (OUTPATIENT)
Dept: HEART AND VASCULAR | Facility: CLINIC | Age: 49
End: 2019-07-11
Payer: MEDICARE

## 2019-07-11 VITALS
RESPIRATION RATE: 14 BRPM | HEIGHT: 64 IN | SYSTOLIC BLOOD PRESSURE: 112 MMHG | HEART RATE: 97 BPM | WEIGHT: 219 LBS | BODY MASS INDEX: 37.39 KG/M2 | DIASTOLIC BLOOD PRESSURE: 80 MMHG | OXYGEN SATURATION: 98 % | TEMPERATURE: 98.2 F

## 2019-07-11 DIAGNOSIS — R07.9 CHEST PAIN, UNSPECIFIED: ICD-10-CM

## 2019-07-11 PROCEDURE — 93000 ELECTROCARDIOGRAM COMPLETE: CPT

## 2019-07-11 PROCEDURE — 36415 COLL VENOUS BLD VENIPUNCTURE: CPT

## 2019-07-11 PROCEDURE — 99214 OFFICE O/P EST MOD 30 MIN: CPT

## 2019-07-12 LAB
ALBUMIN SERPL ELPH-MCNC: 4.3 G/DL
ALP BLD-CCNC: 48 U/L
ALT SERPL-CCNC: 39 U/L
ANION GAP SERPL CALC-SCNC: 11 MMOL/L
APPEARANCE: CLEAR
AST SERPL-CCNC: 41 U/L
BASOPHILS # BLD AUTO: 0.04 K/UL
BASOPHILS NFR BLD AUTO: 0.6 %
BILIRUB SERPL-MCNC: 0.3 MG/DL
BILIRUBIN URINE: NEGATIVE
BLOOD URINE: NEGATIVE
BUN SERPL-MCNC: 13 MG/DL
CALCIUM SERPL-MCNC: 9.9 MG/DL
CHLORIDE SERPL-SCNC: 102 MMOL/L
CHOLEST SERPL-MCNC: 151 MG/DL
CHOLEST/HDLC SERPL: 4.4 RATIO
CO2 SERPL-SCNC: 25 MMOL/L
COLOR: YELLOW
CREAT SERPL-MCNC: 0.83 MG/DL
CREAT SPEC-SCNC: 75 MG/DL
EOSINOPHIL # BLD AUTO: 0.11 K/UL
EOSINOPHIL NFR BLD AUTO: 1.6 %
ESTIMATED AVERAGE GLUCOSE: 128 MG/DL
GLUCOSE QUALITATIVE U: NEGATIVE
GLUCOSE SERPL-MCNC: 104 MG/DL
HBA1C MFR BLD HPLC: 6.1 %
HCT VFR BLD CALC: 39.4 %
HDLC SERPL-MCNC: 34 MG/DL
HGB BLD-MCNC: 11.9 G/DL
IMM GRANULOCYTES NFR BLD AUTO: 0.3 %
KETONES URINE: NEGATIVE
LDLC SERPL CALC-MCNC: 64 MG/DL
LEUKOCYTE ESTERASE URINE: NEGATIVE
LYMPHOCYTES # BLD AUTO: 2.13 K/UL
LYMPHOCYTES NFR BLD AUTO: 31.1 %
MAN DIFF?: NORMAL
MCHC RBC-ENTMCNC: 28.6 PG
MCHC RBC-ENTMCNC: 30.2 GM/DL
MCV RBC AUTO: 94.7 FL
MICROALBUMIN 24H UR DL<=1MG/L-MCNC: <1.2 MG/DL
MICROALBUMIN/CREAT 24H UR-RTO: NORMAL MG/G
MONOCYTES # BLD AUTO: 0.52 K/UL
MONOCYTES NFR BLD AUTO: 7.6 %
NEUTROPHILS # BLD AUTO: 4.02 K/UL
NEUTROPHILS NFR BLD AUTO: 58.8 %
NITRITE URINE: NEGATIVE
PH URINE: 6
PLATELET # BLD AUTO: 332 K/UL
POTASSIUM SERPL-SCNC: 4.4 MMOL/L
PROT SERPL-MCNC: 7.1 G/DL
PROTEIN URINE: NEGATIVE
RBC # BLD: 4.16 M/UL
RBC # FLD: 14.2 %
SODIUM SERPL-SCNC: 138 MMOL/L
SPECIFIC GRAVITY URINE: 1.01
TRIGL SERPL-MCNC: 265 MG/DL
TSH SERPL-ACNC: 3.41 UIU/ML
UROBILINOGEN URINE: NORMAL
WBC # FLD AUTO: 6.84 K/UL

## 2019-07-16 PROBLEM — R07.9 CHEST PAIN: Status: ACTIVE | Noted: 2019-07-16

## 2019-07-16 NOTE — PHYSICAL EXAM
[Well Groomed] : well groomed [No Deformities] : no deformities [General Appearance - In No Acute Distress] : no acute distress [Normal Conjunctiva] : the conjunctiva exhibited no abnormalities [Eyelids - No Xanthelasma] : the eyelids demonstrated no xanthelasmas [No Oral Cyanosis] : no oral cyanosis [Normal Jugular Venous A Waves Present] : normal jugular venous A waves present [Normal Jugular Venous V Waves Present] : normal jugular venous V waves present [No Jugular Venous Rothman A Waves] : no jugular venous rothman A waves [Respiration, Rhythm And Depth] : normal respiratory rhythm and effort [Exaggerated Use Of Accessory Muscles For Inspiration] : no accessory muscle use [Auscultation Breath Sounds / Voice Sounds] : lungs were clear to auscultation bilaterally [Heart Rate And Rhythm] : heart rate and rhythm were normal [Heart Sounds] : normal S1 and S2 [Murmurs] : no murmurs present [Arterial Pulses Normal] : the arterial pulses were normal [Edema] : no peripheral edema present [Abnormal Walk] : normal gait [Gait - Sufficient For Exercise Testing] : the gait was sufficient for exercise testing [Nail Clubbing] : no clubbing of the fingernails [Cyanosis, Localized] : no localized cyanosis [Petechial Hemorrhages (___cm)] : no petechial hemorrhages [Nail Splinter Hemorrhages] : no splinter hemorrhages of the nails [Fingers Osler's Nodes] : Osler's nodes were not seenon the fingers [Skin Color & Pigmentation] : normal skin color and pigmentation [Skin Turgor] : normal skin turgor [] : no rash [No Venous Stasis] : no venous stasis [Skin Lesions] : no skin lesions [No Skin Ulcers] : no skin ulcer [No Xanthoma] : no  xanthoma was observed [Oriented To Time, Place, And Person] : oriented to person, place, and time [Mood] : the mood was normal [FreeTextEntry1] : obese abdomen    midline  5 cm vertical  scar

## 2019-07-16 NOTE — DISCUSSION/SUMMARY
[Coronary Artery Disease] : coronary artery disease [Stable] : stable [Responding to Treatment] : responding to treatment [None] : none [___ Month(s)] : [unfilled] month(s) [With Me] : with me [FreeTextEntry1] : Strongly advised to reconsider permanent weight loss through bariatric surgery which will cure prediabetes, promote increased activity  and decrease low back and multiple joint pains , lessen risk of stroke, promote healing of abdominal surgery  and improve overall quality of life\par \par \par Venipuncture performed  with Hgba1c, lipids, etc

## 2019-07-16 NOTE — REASON FOR VISIT
[Follow-Up - Clinic] : a clinic follow-up of [Chest Pain] : chest pain [Coronary Artery Disease] : coronary artery disease [Hyperlipidemia] : hyperlipidemia [FreeTextEntry1] : 48    year old obese white female  with CAD, hyperlipidemia and prediabetes presents for evaluation of right sided chest pain and palpitations   without dyspnea or diaphoresis. Entire episode lasted  a couple of hours.  \par \par  She is  s/p PTCA/bare metal stent of mLAD stenosis  July 2016. Last stress test was April 2018. \par \par She denies mery  syncope, orthopnea, hematuria or rectal bleeding. She has  no rash.  She is c/o shortness  of breath on exertion  and weight gain.  I She refuses gastric bypass. \par \par Mostly sedentary lifestyle \par \par

## 2019-07-16 NOTE — HISTORY OF PRESENT ILLNESS
[FreeTextEntry1] : s/p hysterectomy for giant fibroids  and repair of ventral hernia  in February 2019 - now planning abdominoplasty \par \par Continues to have low back pain \par \par EKG shows  NSR 69 bpm without ectopy, ischemia or LVH\par \par

## 2019-07-22 ENCOUNTER — APPOINTMENT (OUTPATIENT)
Dept: DERMATOLOGY | Facility: CLINIC | Age: 49
End: 2019-07-22
Payer: MEDICARE

## 2019-07-22 VITALS — DIASTOLIC BLOOD PRESSURE: 89 MMHG | SYSTOLIC BLOOD PRESSURE: 124 MMHG

## 2019-07-22 DIAGNOSIS — Z91.89 OTHER SPECIFIED PERSONAL RISK FACTORS, NOT ELSEWHERE CLASSIFIED: ICD-10-CM

## 2019-07-22 PROCEDURE — 99203 OFFICE O/P NEW LOW 30 MIN: CPT

## 2019-07-22 PROCEDURE — 36415 COLL VENOUS BLD VENIPUNCTURE: CPT

## 2019-07-24 LAB
FERRITIN SERPL-MCNC: 47 NG/ML
IRON SATN MFR SERPL: 23 %
IRON SERPL-MCNC: 101 UG/DL
TIBC SERPL-MCNC: 438 UG/DL
TRANSFERRIN SERPL-MCNC: 349 MG/DL
UIBC SERPL-MCNC: 337 UG/DL

## 2019-08-26 ENCOUNTER — RX RENEWAL (OUTPATIENT)
Age: 49
End: 2019-08-26

## 2019-09-18 ENCOUNTER — APPOINTMENT (OUTPATIENT)
Dept: DERMATOLOGY | Facility: CLINIC | Age: 49
End: 2019-09-18
Payer: MEDICARE

## 2019-09-18 DIAGNOSIS — D23.9 OTHER BENIGN NEOPLASM OF SKIN, UNSPECIFIED: ICD-10-CM

## 2019-09-18 DIAGNOSIS — E65 LOCALIZED ADIPOSITY: ICD-10-CM

## 2019-09-18 DIAGNOSIS — L60.3 NAIL DYSTROPHY: ICD-10-CM

## 2019-09-18 PROCEDURE — 99214 OFFICE O/P EST MOD 30 MIN: CPT

## 2019-11-11 ENCOUNTER — APPOINTMENT (OUTPATIENT)
Dept: OTOLARYNGOLOGY | Facility: CLINIC | Age: 49
End: 2019-11-11
Payer: MEDICARE

## 2019-11-11 VITALS
OXYGEN SATURATION: 96 % | TEMPERATURE: 98.7 F | DIASTOLIC BLOOD PRESSURE: 80 MMHG | SYSTOLIC BLOOD PRESSURE: 114 MMHG | HEART RATE: 86 BPM

## 2019-11-11 DIAGNOSIS — R59.0 LOCALIZED ENLARGED LYMPH NODES: ICD-10-CM

## 2019-11-11 DIAGNOSIS — R09.89 OTHER SPECIFIED SYMPTOMS AND SIGNS INVOLVING THE CIRCULATORY AND RESPIRATORY SYSTEMS: ICD-10-CM

## 2019-11-11 DIAGNOSIS — R22.1 LOCALIZED SWELLING, MASS AND LUMP, NECK: ICD-10-CM

## 2019-11-11 DIAGNOSIS — J04.0 ACUTE LARYNGITIS: ICD-10-CM

## 2019-11-11 DIAGNOSIS — K21.9 ACUTE LARYNGITIS: ICD-10-CM

## 2019-11-11 DIAGNOSIS — D44.0 NEOPLASM OF UNCERTAIN BEHAVIOR OF THYROID GLAND: ICD-10-CM

## 2019-11-11 DIAGNOSIS — E04.2 NONTOXIC MULTINODULAR GOITER: ICD-10-CM

## 2019-11-11 DIAGNOSIS — J31.0 CHRONIC RHINITIS: ICD-10-CM

## 2019-11-11 PROCEDURE — 99214 OFFICE O/P EST MOD 30 MIN: CPT | Mod: 25

## 2019-11-11 PROCEDURE — 31575 DIAGNOSTIC LARYNGOSCOPY: CPT

## 2019-11-11 NOTE — PROCEDURE
[Image(s) Captured] : image(s) captured and filed [Unable to Cooperate with Mirror] : patient unable to cooperate with mirror [Complicated Symptoms] : complicated symptoms requiring more thorough examination than provided by mirror [Gag Reflex] : gag reflex preventing mirror examination [Topical Lidocaine] : topical lidocaine [Globus] : globus [Flexible Endoscope] : examined with the flexible endoscope [Serial Number: ___] : Serial Number: [unfilled] [de-identified] : The nasal septum is minimally deviated to the left anteriorly. There are no masses or polyps and the nasal mucosa and secretions are thick but not purulent on the right.  The choanae and posterior nasopharynx are normal without masses or drainage. The inferior turbinates have been partially resected. The Eustachian tube orifices appear patent. The pharynx, including the posterior and lateral pharyngeal walls, the vallecula and base of tongue are normal without ulcerations, lesions or masses. The hypopharynx including the pyriform sinuses open well without pooling of secretions, mucosal lesions or masses. The supraglottic larynx including the epiglottis, petiole, glossoepiglottic folds and pharyngoepiglottic folds are normal without mucosal lesions, ulcerations or masses. The glottis reveals normal false vocal folds. The true vocal folds are glistening white, tense and of equal length, without paralysis, having symmetric mobility on adduction and abduction. There are no mucosal lesions, nodules, cysts, erythroplasia or leukoplakia. The posterior cricoid area has healthy pink mucosa in the interarytenoid area and esophageal inlet. There is mild thickening/edema of the interarytenoid mucosa suggestive of posterior laryngitis from laryngopharyngeal acid reflux disease. The trachea is clear without narrowing in the immediate subglottic region, without deviation or lesions. \par  [de-identified] : GERD, thyroid nodules, globus.

## 2019-11-11 NOTE — HISTORY OF PRESENT ILLNESS
[FreeTextEntry1] : Sowmya is a healthy 49-year-old woman with hypothyroidism and bilateral subcentimeter thyroid nodules.   She had a thyroid US that was stable in 2017. She has been on thyroid hormone replacement at a dose of Synthroid 50 mcgs daily and followed by an endocrinologist Dr. Mccartney.  She has a history of chronic Lyme disease treated 15 years ago. Her weight has fluctuated by 10-15 lbs.  She has not had a follow-up ultrasound since 2017.  She has chronic knee arthritis related to her Lyme disease. She denies any recent voice changes, shortness of breath, neck pain or pressure. She does have occasional episodes of mild pressure when swallowing due to GERD and she is currently on a PPI for this as well. She has moderate globus sensation for the past 6-8 months.  She had an upper GI endoscopy 4-5 years ago and found to  have gastritis. She needed a cardiac stent placed in 2016 and is on ASA.  She has a large uterine fibroid and is to have SIS later this month.  She is concerned about a sensation of swelling in the base of her throat and need for intubation.  Her mother had a thyroidectomy but it was benign. She fell in 2015 and sustained a head injury and felt to have a mild concussion. 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   She has dry eyes at times and rhinorrhea with rare headaches. Her nasal secretions, however, are generally clear.  She has intermittent BPPV.  She has bilateral tinnitus since her Lyme disease diagnosis. She had a SIS for fibroids and has an umbilical hernia scheduled for repair.  She has a stable left suboccipital lymph node since childhood that she felt had become firmer over the past few months and concerned about the change. \par  [de-identified] : Sowmya is a 42 y/o female who approximately 18 months ago was found to be hypothyroid associated with memory loss, alopecia, slight dysphagia and weight gain.   She also has a history of having had Lyme disease and fibromyalgia.  She has been on thyroid hormone replacement for approximately one year with some improvement in her symptoms on Synthroid brand, 50 mcg.   Her TFTs are now normal but she is concerned about intermittent slight dysphagia for saliva but food and liquids pass easily. She has known GERD and gastritis.  She has had multiple EGDs in the past, last done 4 months ago (Dr. Damian at [facility])  and found to have mild gastritis.  She takes omeprazole regularly since then.  Her weight fluctuates do to lack of exercise from periods of pain due to lumbar spine disease and fibromyalgia. She has irregular menstrual cycles being evaluated by her Gyn MD.  Her depression and anxiety is pain related.  Her mother had hypothyroidism as well as a benign thyroid nodule removed.  There is a history of hypothyroidism on her mother's side of the family but no known history of thyroid cancer.  She has no known radiation exposures to the neck.  She denies any recent voice changes or difficulty breathing other than with exertion but intermittent. Her cardiac w/u in the past had been normal. She does not snore or have excessive sleepiness.  She did not have antithyroid antibodies in the past and the etiology of her hypothyroidism is unknown. She never had a thyroid ultrasound.  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 \par

## 2019-11-11 NOTE — REASON FOR VISIT
[FreeTextEntry1] : PCP is Dr. Sargent,  Gyn is Inga Salinas [FreeTextEntry2] : follow up multiple thyroid nodules, Globus sensation, GERD, Rhinorrhea and left suboccipital lymph node.

## 2019-11-13 ENCOUNTER — RX RENEWAL (OUTPATIENT)
Age: 49
End: 2019-11-13

## 2019-12-12 ENCOUNTER — APPOINTMENT (OUTPATIENT)
Dept: ENDOCRINOLOGY | Facility: CLINIC | Age: 49
End: 2019-12-12
Payer: MEDICARE

## 2019-12-12 VITALS
SYSTOLIC BLOOD PRESSURE: 128 MMHG | BODY MASS INDEX: 37.56 KG/M2 | HEART RATE: 101 BPM | DIASTOLIC BLOOD PRESSURE: 88 MMHG | HEIGHT: 64 IN | WEIGHT: 220 LBS

## 2019-12-12 LAB — HBA1C MFR BLD HPLC: 6.5

## 2019-12-12 PROCEDURE — 83036 HEMOGLOBIN GLYCOSYLATED A1C: CPT | Mod: QW

## 2019-12-12 PROCEDURE — 99215 OFFICE O/P EST HI 40 MIN: CPT | Mod: 25

## 2019-12-22 ENCOUNTER — RX RENEWAL (OUTPATIENT)
Age: 49
End: 2019-12-22

## 2020-01-13 ENCOUNTER — MOBILE ON CALL (OUTPATIENT)
Age: 50
End: 2020-01-13

## 2020-01-14 NOTE — HISTORY OF PRESENT ILLNESS
[FreeTextEntry1] : Ms. 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 Lyons, elevated body mass index, elevated HbA1c presenting for follow-up of her endocrine issues. I saw her for an initial visit in March 2019. Of note, her HbA1c today was 6.5%, with a new diagnosis of type 2 diabetes mellitus.\par \par Type 2 diabetes mellitus. Point-of-care HbA1c 6.5% today. Coronary artery disease. \par She was diagnosed with diabetes in December 2019 by HbA1c. No hospitalizations for hypo- or hyperglycemia.\par She was taking metformin in the past and we restarted metformin  mg daily this past week per her request after receiving a steroid injection. She did not tolerate Victoza in the past for weight loss due to issues with self-injection. \par She is not on a blood pressure regimen\par She is on a statin for cholesterol\par Nephrology screening: Urine microalbumin within range in July 2019\par Last ophthalmology appointment: Over a year\par Last dental appointment: Over a year\par \par Hypothyroidism.\par She was diagnosed with hypothyroidism around 2012.\par She has been on Synthroid (brand-name) 50 mcg daily since that time.\par She is taking omeprazole first in the morning, on an empty stomach, then is taking levothyroxine about an hour later, with plain water, and waiting at least 30 minutes before eating. She is not taking calcium/iron/multivitamin. She has been on omeprazole for about five years.\par No history of radiation exposure.\par Her mother has a history of hypothyroidism and nontoxic multinodular goiter, and maternal grandmother with goiter.\par \par Interim History \par She has multiple disc issues and had a steroid injection last week. \par She saw Chayito Brandon, Eloy, and Lora; notes reviewed.\par No chest pain, shortness of breath, polyuria/polydipsia, lower extremity numbness/tingling. \par Medical and surgical history, medications, allergies, social and family history reviewed and updated as needed.

## 2020-01-14 NOTE — ADDENDUM
[FreeTextEntry1] : Ms. Gomez states she felt well for a few weeks on Trulicity. At the beginning of January, she started to notice an increase in appetite and gastrointestinal side effects. She also noted other side effects, including agitation, occasional dysphagia, decrease in bladder emptying, muscle spasms. I advised her to discontinue Trulicity; we can readdress options at her upcoming visit in February. We discussed that some of these symptoms were unlikely due to Trulicity and to discuss with her primary care provider if they continue. 1/14/20

## 2020-01-14 NOTE — ASSESSMENT
[FreeTextEntry1] : Type 2 diabetes mellitus/elevated body mass index. Point-of-care HbA1c 6.5% today with a new diagnosis of type 2 diabetes mellitus. Coronary artery disease. We discussed the cardiovascular and microvascular complications of diabetes. We discussed the importance of diet and exercise and lifestyle modification for glycemic control and weight loss. We discussed follow-up with nutrition. We discussed pharmacologic options for glycemic control and weight loss. She is tolerating metformin and we will continue. She is amenable to starting a GLP-1 receptor agonist. We discussed the risks and benefits of the GLP-1 receptor agonist class, including but not limited to nausea, pancreatitis, medullary thyroid cancer. We reviewed subcutaneous injection technique, storage, and sharps disposal. She administered a dose of Trulicity in the office. \par Continue metformin  mg daily\par Start Trulicity 0.75 mg weekly if covered by insurance (samples given)\par She is not on a blood pressure regimen; blood pressure around goal\par She is on a statin for cholesterol; last lipid panel around goal\par Nephrology screening: Urine microalbumin within range in July 2019\par Last ophthalmology appointment: Over a year and advised appointment\par Last dental appointment: Over a year and advised appointment\par \par Hypothyroidism. She has been clinically and biochemically euthyroid on her current regimen of levothyroxine. We reviewed proper use and compliance with levothyroxine.\par Continue Synthroid 50 mcg daily\par \par Return to clinic in 3 months.

## 2020-01-14 NOTE — PHYSICAL EXAM
[No Acute Distress] : no acute distress [Healthy Appearance] : healthy appearance [Alert] : alert [Normal Sclera/Conjunctiva] : normal sclera/conjunctiva [No Neck Mass] : no neck mass was observed [Normal Oropharynx] : the oropharynx was normal [Supple] : the neck was supple [Thyroid Not Enlarged] : the thyroid was not enlarged [No LAD] : no lymphadenopathy [Normal Rate and Effort] : normal respiratory rhythm and effort [No Thyroid Nodules] : there were no palpable thyroid nodules [Clear to Auscultation] : lungs were clear to auscultation bilaterally [Normal Rate] : heart rate was normal  [Normal S1, S2] : normal S1 and S2 [Normal Gait] : normal gait [Regular Rhythm] : with a regular rhythm [No Stigmata of Cushings Syndrome] : no stigmata of cushings syndrome [Normal Insight/Judgement] : insight and judgment were intact [Kyphosis] : no kyphosis present [Acanthosis Nigricans] : no acanthosis nigricans [de-identified] : no moon facies, no supraclavicular fat pads

## 2020-01-17 DIAGNOSIS — H60.90 UNSPECIFIED OTITIS EXTERNA, UNSPECIFIED EAR: ICD-10-CM

## 2020-02-11 ENCOUNTER — APPOINTMENT (OUTPATIENT)
Dept: ENDOCRINOLOGY | Facility: CLINIC | Age: 50
End: 2020-02-11
Payer: MEDICARE

## 2020-02-11 VITALS
WEIGHT: 224 LBS | BODY MASS INDEX: 38.24 KG/M2 | SYSTOLIC BLOOD PRESSURE: 129 MMHG | HEART RATE: 111 BPM | DIASTOLIC BLOOD PRESSURE: 83 MMHG | HEIGHT: 64 IN

## 2020-02-11 LAB
GLUCOSE BLDC GLUCOMTR-MCNC: 249
HBA1C MFR BLD HPLC: 7

## 2020-02-11 PROCEDURE — 83036 HEMOGLOBIN GLYCOSYLATED A1C: CPT | Mod: QW

## 2020-02-11 PROCEDURE — 82962 GLUCOSE BLOOD TEST: CPT

## 2020-02-11 PROCEDURE — 99214 OFFICE O/P EST MOD 30 MIN: CPT | Mod: 25

## 2020-02-11 RX ORDER — LEVOFLOXACIN 500 MG/1
500 TABLET, FILM COATED ORAL DAILY
Qty: 5 | Refills: 0 | Status: DISCONTINUED | COMMUNITY
Start: 2020-01-17 | End: 2020-02-11

## 2020-02-11 RX ORDER — DULAGLUTIDE 0.75 MG/.5ML
0.75 INJECTION, SOLUTION SUBCUTANEOUS
Qty: 3 | Refills: 1 | Status: DISCONTINUED | COMMUNITY
Start: 2019-12-12 | End: 2020-02-11

## 2020-02-11 RX ORDER — DULAGLUTIDE 0.75 MG/.5ML
0.75 INJECTION, SOLUTION SUBCUTANEOUS
Qty: 2 | Refills: 0 | Status: DISCONTINUED | OUTPATIENT
Start: 2019-12-12 | End: 2020-02-11

## 2020-03-09 NOTE — HISTORY OF PRESENT ILLNESS
[FreeTextEntry1] : Ms. 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 Lyons, elevated body mass index presenting for follow-up of her endocrine issues. I saw her for an initial visit in March 2019 and last in December.\par \par Type 2 diabetes mellitus. Point-of-care HbA1c 7.0% and blood glucose 249 mg/dL today; HbA1c 6.5% in December 2019. Coronary artery disease. \par She was diagnosed with diabetes in December 2019 by HbA1c. No hospitalizations for hypo- or hyperglycemia.\par We restarted metformin  mg daily in December 2019. She did not tolerate Victoza in the past for weight loss due to issues with self-injection. In December 2019 we started Trulicity but she did not tolerate as below.\par She is not on a blood pressure regimen\par She is on a statin for cholesterol\par Nephrology screening: Urine microalbumin within range in July 2019\par Last ophthalmology appointment: Over a year\par Last dental appointment: Over a year\par \par Hypothyroidism.\par She was diagnosed with hypothyroidism around 2012.\par She has been on Synthroid (brand-name) 50 mcg daily since that time.\par She is taking omeprazole first in the morning, on an empty stomach, then is taking levothyroxine about an hour later, with plain water, and waiting at least 30 minutes before eating. She is not taking calcium/iron/multivitamin. She has been on omeprazole for about five years.\par No history of radiation exposure.\par Her mother has a history of hypothyroidism and nontoxic multinodular goiter, and maternal grandmother with goiter.\par \par Interim History \par In December 2019 we started Trulicity. She felt well for a few weeks but at the beginning of January, she started to notice an increase in appetite and gastrointestinal side effects. She also noted other side effects, including agitation, occasional dysphagia, decrease in bladder emptying, muscle spasms. She discontinued Trulicity at that time with improvement in symptoms.\par She has multiple disc issues and has had multiple steroid injections. \par Weight is up 5 pounds since last visit. No chest pain, shortness of breath, polyuria/polydipsia, lower extremity numbness/tingling. \par Medical and surgical history, medications, allergies, social and family history reviewed and updated as needed.

## 2020-03-09 NOTE — ADDENDUM
[FreeTextEntry1] : Ms. Gomez had pins and needles sensations in her feet with Jardiance and is concerned for her circulation; she read about class-action lawsuits against Jardiance due to risk of toe amputation. I advised she speak with her neurologist about her symptoms. I advised she should discontinue Jardiance since she is concerned. We can readdress her regimen next visit. 3/09/20

## 2020-03-09 NOTE — PHYSICAL EXAM
[Alert] : alert [No Acute Distress] : no acute distress [Healthy Appearance] : healthy appearance [Normal Sclera/Conjunctiva] : normal sclera/conjunctiva [Normal Oropharynx] : the oropharynx was normal [No Neck Mass] : no neck mass was observed [Supple] : the neck was supple [No LAD] : no lymphadenopathy [No Thyroid Nodules] : there were no palpable thyroid nodules [Thyroid Not Enlarged] : the thyroid was not enlarged [Normal Rate and Effort] : normal respiratory rhythm and effort [Clear to Auscultation] : lungs were clear to auscultation bilaterally [Normal Rate] : heart rate was normal  [Normal S1, S2] : normal S1 and S2 [Regular Rhythm] : with a regular rhythm [No Stigmata of Cushings Syndrome] : no stigmata of cushings syndrome [Normal Gait] : normal gait [Right Foot Was Examined] : right foot ~C was examined [Left Foot Was Examined] : left foot ~C was examined [Normal] : normal [2+] : 2+ in the dorsalis pedis [Normal Insight/Judgement] : insight and judgment were intact [Kyphosis] : no kyphosis present [Acanthosis Nigricans] : no acanthosis nigricans [Diminished Throughout Both Feet] : normal tactile sensation with monofilament testing throughout both feet [de-identified] : no moon facies, no supraclavicular fat pads

## 2020-03-09 NOTE — ASSESSMENT
[FreeTextEntry1] : Type 2 diabetes mellitus/elevated body mass index. Point-of-care HbA1c 7.0% and blood glucose 249 mg/dL today; HbA1c 6.5% in December 2019. Coronary artery disease. We discussed the cardiovascular and microvascular complications of diabetes. We discussed the importance of diet and exercise and lifestyle modification for glycemic control and weight loss. She declines follow-up with nutrition. We discussed pharmacologic options for glycemic control and weight loss. She is tolerating metformin and we will adjust as below. She is amenable to an SGLT-2 inhibitor. We discussed the risks and benefits of the SGLT-2 inhibitor class, including but not limited to urinary tract infection, fungal infection, Pillo's gangrene. \par Adjust metformin ER up to 2000 mg daily as tolerated\par Start Jardiance 10 mg daily if covered by insurance\par She is not on a blood pressure regimen; blood pressure around goal\par She is on a statin for cholesterol; last lipid panel around goal\par Nephrology screening: Urine microalbumin within range in July 2019\par Last ophthalmology appointment: Over a year and advised appointment\par Last dental appointment: Over a year; upcoming appointment in February 2020\par \par Hypothyroidism. She has been clinically and biochemically euthyroid on her current regimen of levothyroxine. We reviewed proper use and compliance with levothyroxine.\par Continue Synthroid 50 mcg daily\par \par Return to see me in 3 months. Patient advised to call earlier with significant hypo- or hyperglycemia.

## 2020-06-05 ENCOUNTER — APPOINTMENT (OUTPATIENT)
Dept: HEART AND VASCULAR | Facility: CLINIC | Age: 50
End: 2020-06-05

## 2020-06-05 ENCOUNTER — APPOINTMENT (OUTPATIENT)
Dept: HEART AND VASCULAR | Facility: CLINIC | Age: 50
End: 2020-06-05
Payer: MEDICARE

## 2020-06-05 ENCOUNTER — LABORATORY RESULT (OUTPATIENT)
Age: 50
End: 2020-06-05

## 2020-06-05 VITALS
WEIGHT: 223 LBS | HEART RATE: 95 BPM | SYSTOLIC BLOOD PRESSURE: 104 MMHG | DIASTOLIC BLOOD PRESSURE: 82 MMHG | HEIGHT: 64 IN | TEMPERATURE: 98.6 F | BODY MASS INDEX: 38.07 KG/M2 | RESPIRATION RATE: 14 BRPM | OXYGEN SATURATION: 97 %

## 2020-06-05 DIAGNOSIS — R94.31 ABNORMAL ELECTROCARDIOGRAM [ECG] [EKG]: ICD-10-CM

## 2020-06-05 PROCEDURE — 36415 COLL VENOUS BLD VENIPUNCTURE: CPT

## 2020-06-07 PROBLEM — R94.31 ABNORMAL ELECTROCARDIOGRAM: Status: ACTIVE | Noted: 2018-03-18

## 2020-06-07 LAB
25(OH)D3 SERPL-MCNC: 39 NG/ML
ALBUMIN SERPL ELPH-MCNC: 4.8 G/DL
ALP BLD-CCNC: 56 U/L
ALT SERPL-CCNC: 50 U/L
ANION GAP SERPL CALC-SCNC: 15 MMOL/L
APPEARANCE: ABNORMAL
AST SERPL-CCNC: 35 U/L
BASOPHILS # BLD AUTO: 0.04 K/UL
BASOPHILS NFR BLD AUTO: 0.5 %
BILIRUB SERPL-MCNC: 0.2 MG/DL
BILIRUBIN URINE: NEGATIVE
BLOOD URINE: NEGATIVE
BUN SERPL-MCNC: 15 MG/DL
CALCIUM SERPL-MCNC: 10.2 MG/DL
CHLORIDE SERPL-SCNC: 100 MMOL/L
CHOLEST SERPL-MCNC: 165 MG/DL
CHOLEST/HDLC SERPL: 3.9 RATIO
CO2 SERPL-SCNC: 23 MMOL/L
COLOR: NORMAL
CREAT SERPL-MCNC: 0.74 MG/DL
CREAT SPEC-SCNC: 106 MG/DL
EOSINOPHIL # BLD AUTO: 0.15 K/UL
EOSINOPHIL NFR BLD AUTO: 1.8 %
ESTRADIOL SERPL-MCNC: 153 PG/ML
GLUCOSE QUALITATIVE U: NEGATIVE
GLUCOSE SERPL-MCNC: 96 MG/DL
HCT VFR BLD CALC: 41.5 %
HDLC SERPL-MCNC: 43 MG/DL
HGB BLD-MCNC: 12.9 G/DL
IMM GRANULOCYTES NFR BLD AUTO: 0.5 %
KETONES URINE: NEGATIVE
LDLC SERPL CALC-MCNC: 57 MG/DL
LEUKOCYTE ESTERASE URINE: NEGATIVE
LH SERPL-ACNC: 30.7 IU/L
LYMPHOCYTES # BLD AUTO: 2.7 K/UL
LYMPHOCYTES NFR BLD AUTO: 33.1 %
MAN DIFF?: NORMAL
MCHC RBC-ENTMCNC: 29.1 PG
MCHC RBC-ENTMCNC: 31.1 GM/DL
MCV RBC AUTO: 93.5 FL
MICROALBUMIN 24H UR DL<=1MG/L-MCNC: <1.2 MG/DL
MICROALBUMIN/CREAT 24H UR-RTO: NORMAL MG/G
MONOCYTES # BLD AUTO: 0.51 K/UL
MONOCYTES NFR BLD AUTO: 6.3 %
NEUTROPHILS # BLD AUTO: 4.72 K/UL
NEUTROPHILS NFR BLD AUTO: 57.8 %
NITRITE URINE: NEGATIVE
PH URINE: 5.5
PLATELET # BLD AUTO: 398 K/UL
POTASSIUM SERPL-SCNC: 4.5 MMOL/L
PROGEST SERPL-MCNC: 0.4 NG/ML
PROT SERPL-MCNC: 7.5 G/DL
PROTEIN URINE: NEGATIVE
RBC # BLD: 4.44 M/UL
RBC # FLD: 13.2 %
SARS-COV-2 IGG SERPL IA-ACNC: <3.8 AU/ML
SARS-COV-2 IGG SERPL QL IA: NEGATIVE
SODIUM SERPL-SCNC: 138 MMOL/L
SPECIFIC GRAVITY URINE: 1.02
TRIGL SERPL-MCNC: 327 MG/DL
TSH SERPL-ACNC: 3.27 UIU/ML
UROBILINOGEN URINE: NORMAL
WBC # FLD AUTO: 8.16 K/UL

## 2020-06-08 LAB
ABO + RH PNL BLD: NORMAL
ESTIMATED AVERAGE GLUCOSE: 137 MG/DL
HBA1C MFR BLD HPLC: 6.4 %

## 2020-06-11 LAB — FSH: 18 MIU/ML

## 2020-06-23 ENCOUNTER — RX RENEWAL (OUTPATIENT)
Age: 50
End: 2020-06-23

## 2020-10-21 ENCOUNTER — APPOINTMENT (OUTPATIENT)
Dept: HEART AND VASCULAR | Facility: CLINIC | Age: 50
End: 2020-10-21
Payer: MEDICARE

## 2020-10-21 VITALS
HEART RATE: 85 BPM | SYSTOLIC BLOOD PRESSURE: 116 MMHG | TEMPERATURE: 97 F | HEIGHT: 64 IN | RESPIRATION RATE: 16 BRPM | DIASTOLIC BLOOD PRESSURE: 72 MMHG | OXYGEN SATURATION: 98 %

## 2020-10-21 VITALS — BODY MASS INDEX: 37.25 KG/M2 | WEIGHT: 217 LBS

## 2020-10-21 DIAGNOSIS — Z23 ENCOUNTER FOR IMMUNIZATION: ICD-10-CM

## 2020-10-21 PROCEDURE — 90686 IIV4 VACC NO PRSV 0.5 ML IM: CPT

## 2020-10-21 PROCEDURE — G0008: CPT

## 2020-11-13 ENCOUNTER — APPOINTMENT (OUTPATIENT)
Dept: HEART AND VASCULAR | Facility: CLINIC | Age: 50
End: 2020-11-13
Payer: MEDICARE

## 2020-11-13 VITALS
WEIGHT: 215 LBS | SYSTOLIC BLOOD PRESSURE: 122 MMHG | HEIGHT: 64 IN | BODY MASS INDEX: 36.7 KG/M2 | RESPIRATION RATE: 16 BRPM | OXYGEN SATURATION: 99 % | DIASTOLIC BLOOD PRESSURE: 80 MMHG | HEART RATE: 86 BPM

## 2020-11-13 DIAGNOSIS — R06.00 DYSPNEA, UNSPECIFIED: ICD-10-CM

## 2020-11-13 DIAGNOSIS — F33.8 OTHER RECURRENT DEPRESSIVE DISORDERS: ICD-10-CM

## 2020-11-13 PROCEDURE — 93015 CV STRESS TEST SUPVJ I&R: CPT

## 2020-11-13 PROCEDURE — 78452 HT MUSCLE IMAGE SPECT MULT: CPT

## 2020-11-13 PROCEDURE — 93306 TTE W/DOPPLER COMPLETE: CPT

## 2020-11-13 PROCEDURE — A9500: CPT

## 2020-12-16 PROBLEM — Z87.09 HISTORY OF ACUTE BRONCHITIS: Status: RESOLVED | Noted: 2018-08-10 | Resolved: 2020-12-16

## 2021-01-04 ENCOUNTER — APPOINTMENT (OUTPATIENT)
Dept: ENDOCRINOLOGY | Facility: CLINIC | Age: 51
End: 2021-01-04
Payer: MEDICARE

## 2021-01-04 PROCEDURE — 99213 OFFICE O/P EST LOW 20 MIN: CPT | Mod: 95

## 2021-01-04 RX ORDER — EMPAGLIFLOZIN 10 MG/1
10 TABLET, FILM COATED ORAL DAILY
Qty: 90 | Refills: 1 | Status: DISCONTINUED | COMMUNITY
Start: 2020-02-11 | End: 2021-01-04

## 2021-01-04 NOTE — ASSESSMENT
[FreeTextEntry1] : Type 2 diabetes mellitus/elevated body mass index. HbA1c 6.4% in June 2020, 7.0% in February 2020, 6.5% in December 2019. Coronary artery disease. We discussed the cardiovascular and microvascular complications of diabetes. We discussed the importance of diet and exercise and lifestyle modification for glycemic control and weight loss. She has declined follow-up with nutrition. We discussed pharmacologic options for glycemic control and weight loss. We will restart metformin as below. I encouraged further consideration of gastric banding.\par Restart metformin  mg daily up to 2000 mg daily as tolerated\par She is not on a blood pressure regimen; last blood pressure around goal\par She is on a statin for cholesterol; last LDL panel around goal with elevated nonfasting triglycerides\par Nephropathy screening: Urine microalbumin within range in June 2020\par Last ophthalmology appointment: Over a year; advised appointment when appropriate\par Last dental appointment: Over a year; advised appointment when appropriate\par \par Hypothyroidism. She has been clinically and biochemically euthyroid on her current regimen of levothyroxine. We reviewed proper use and compliance with levothyroxine.\par Continue Synthroid 50 mcg daily\par \par Return to see me in 3 months. Patient advised to call earlier with significant hypo- or hyperglycemia.

## 2021-01-04 NOTE — HISTORY OF PRESENT ILLNESS
[Home] : at home, [unfilled] , at the time of the visit. [Medical Office: ([facility])___] : at the medical office located in  [Verbal consent obtained from patient] : the patient, [unfilled] [FreeTextEntry1] : Ms. 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 Mal Lyons, elevated body mass index presenting for follow-up of her endocrine issues. I saw her for an initial visit in March 2019 and last in February 2020.\par \par Type 2 diabetes mellitus. HbA1c 6.4% in June 2020, 7.0% in February 2020, 6.5% in December 2019. Coronary artery disease. \par She was diagnosed with diabetes in December 2019 by HbA1c. No hospitalizations for hypo- or hyperglycemia.\par We restarted metformin  mg daily in December 2019. She did not tolerate Victoza in the past for weight loss due to issues with self-injection. In December 2019 we started Trulicity but she did not tolerate. In February 2020, we adjusted metformin up to 2000 mg daily and started Jardiance 10 mg daily; she stopped Jardiance due to concern for side effects. She has been off metformin since the fall due to concern for the recall. \par She is not on a blood pressure regimen\par She is on a statin for cholesterol\par Nephropathy screening: Urine microalbumin within range in June 2020\par Last ophthalmology appointment: Over a year\par Last dental appointment: Over a year\par \par Hypothyroidism.\par She was diagnosed with hypothyroidism around 2012.\par She has been on Synthroid (brand-name) 50 mcg daily since that time.\par She is taking omeprazole first in the morning, on an empty stomach, then is taking levothyroxine about an hour later, with plain water, and waiting at least 30 minutes before eating. She is not taking calcium/iron/multivitamin. She has been on omeprazole for about five years.\par No history of radiation exposure.\par Her mother has a history of hypothyroidism and nontoxic multinodular goiter, and maternal grandmother with goiter.\par \par Interim History \par In February 2020, we adjusted metformin up to 2000 mg daily and started Jardiance 10 mg daily; she stopped Jardiance due to concern for side effects. She has been off metformin since the fall due to concern for the recall. \par She had a hysterectomy. \par She has needed a steroid injection for neck pain, which transiently raised her blood glucose.\par Her mother has been in the emergency department with atrial fibrillation. \par She has lost some weight with lifestyle changes. She is considering a gastric band. \par Some lower extremity numbness attributed to cervical disease. No chest pain or shortness of breath. \par Medical and surgical history, medications, allergies, social and family history reviewed and updated as needed.

## 2021-01-13 ENCOUNTER — APPOINTMENT (OUTPATIENT)
Dept: DERMATOLOGY | Facility: CLINIC | Age: 51
End: 2021-01-13
Payer: MEDICARE

## 2021-01-13 DIAGNOSIS — L30.9 DERMATITIS, UNSPECIFIED: ICD-10-CM

## 2021-01-13 DIAGNOSIS — L24.9 IRRITANT CONTACT DERMATITIS, UNSPECIFIED CAUSE: ICD-10-CM

## 2021-01-13 DIAGNOSIS — H02.9 UNSPECIFIED DISORDER OF EYELID: ICD-10-CM

## 2021-01-13 DIAGNOSIS — L90.5 SCAR CONDITIONS AND FIBROSIS OF SKIN: ICD-10-CM

## 2021-01-13 PROCEDURE — 99214 OFFICE O/P EST MOD 30 MIN: CPT

## 2021-02-11 ENCOUNTER — APPOINTMENT (OUTPATIENT)
Dept: HEART AND VASCULAR | Facility: CLINIC | Age: 51
End: 2021-02-11
Payer: MEDICARE

## 2021-02-11 VITALS
RESPIRATION RATE: 15 BRPM | BODY MASS INDEX: 36.88 KG/M2 | HEART RATE: 94 BPM | OXYGEN SATURATION: 97 % | TEMPERATURE: 97.1 F | SYSTOLIC BLOOD PRESSURE: 122 MMHG | HEIGHT: 64 IN | WEIGHT: 216 LBS | DIASTOLIC BLOOD PRESSURE: 80 MMHG

## 2021-02-11 DIAGNOSIS — Z00.00 ENCOUNTER FOR GENERAL ADULT MEDICAL EXAMINATION W/OUT ABNORMAL FINDINGS: ICD-10-CM

## 2021-02-11 DIAGNOSIS — I25.10 ATHEROSCLEROTIC HEART DISEASE OF NATIVE CORONARY ARTERY W/OUT ANGINA PECTORIS: ICD-10-CM

## 2021-02-11 PROCEDURE — 90471 IMMUNIZATION ADMIN: CPT

## 2021-02-11 PROCEDURE — 36415 COLL VENOUS BLD VENIPUNCTURE: CPT

## 2021-02-11 PROCEDURE — 90715 TDAP VACCINE 7 YRS/> IM: CPT

## 2021-02-11 PROCEDURE — G0438: CPT

## 2021-02-11 PROCEDURE — 93000 ELECTROCARDIOGRAM COMPLETE: CPT

## 2021-02-11 RX ORDER — LEVETIRACETAM 250 MG/1
250 TABLET, FILM COATED ORAL
Qty: 60 | Refills: 0 | Status: DISCONTINUED | COMMUNITY
Start: 2020-08-12 | End: 2021-02-11

## 2021-02-11 RX ORDER — FOLIC ACID 1 MG/1
1 TABLET ORAL DAILY
Qty: 30 | Refills: 11 | Status: DISCONTINUED | COMMUNITY
Start: 2019-07-22 | End: 2021-02-11

## 2021-02-11 RX ORDER — RANITIDINE 150 MG/1
150 TABLET ORAL
Qty: 60 | Refills: 0 | Status: DISCONTINUED | COMMUNITY
Start: 2019-01-10 | End: 2021-02-11

## 2021-02-13 LAB
25(OH)D3 SERPL-MCNC: 41.1 NG/ML
ALBUMIN SERPL ELPH-MCNC: 4.6 G/DL
ALP BLD-CCNC: 68 U/L
ALT SERPL-CCNC: 84 U/L
ANION GAP SERPL CALC-SCNC: 15 MMOL/L
AST SERPL-CCNC: 63 U/L
BASOPHILS # BLD AUTO: 0.04 K/UL
BASOPHILS NFR BLD AUTO: 0.6 %
BILIRUB SERPL-MCNC: 0.2 MG/DL
BUN SERPL-MCNC: 13 MG/DL
CALCIUM SERPL-MCNC: 10.1 MG/DL
CHLORIDE SERPL-SCNC: 101 MMOL/L
CHOLEST SERPL-MCNC: 196 MG/DL
CO2 SERPL-SCNC: 22 MMOL/L
CREAT SERPL-MCNC: 0.9 MG/DL
EOSINOPHIL # BLD AUTO: 0.1 K/UL
EOSINOPHIL NFR BLD AUTO: 1.5 %
ESTIMATED AVERAGE GLUCOSE: 134 MG/DL
GLUCOSE SERPL-MCNC: 88 MG/DL
HBA1C MFR BLD HPLC: 6.3 %
HBV SURFACE AB SERPL IA-ACNC: <3 MIU/ML
HBV SURFACE AG SER QL: NONREACTIVE
HCT VFR BLD CALC: 40.4 %
HCV AB SER QL: NONREACTIVE
HCV S/CO RATIO: 0.06 S/CO
HDLC SERPL-MCNC: 37 MG/DL
HEPATITIS A IGG ANTIBODY: NONREACTIVE
HGB BLD-MCNC: 12.6 G/DL
IMM GRANULOCYTES NFR BLD AUTO: 0.3 %
LDLC SERPL CALC-MCNC: 86 MG/DL
LYMPHOCYTES # BLD AUTO: 2.29 K/UL
LYMPHOCYTES NFR BLD AUTO: 33.7 %
MAN DIFF?: NORMAL
MCHC RBC-ENTMCNC: 29.6 PG
MCHC RBC-ENTMCNC: 31.2 GM/DL
MCV RBC AUTO: 94.8 FL
MONOCYTES # BLD AUTO: 0.43 K/UL
MONOCYTES NFR BLD AUTO: 6.3 %
NEUTROPHILS # BLD AUTO: 3.91 K/UL
NEUTROPHILS NFR BLD AUTO: 57.6 %
NONHDLC SERPL-MCNC: 159 MG/DL
PLATELET # BLD AUTO: 389 K/UL
POTASSIUM SERPL-SCNC: 4.8 MMOL/L
PROT SERPL-MCNC: 7.7 G/DL
RBC # BLD: 4.26 M/UL
RBC # FLD: 12.8 %
SARS-COV-2 IGG SERPL IA-ACNC: 0.06 INDEX
SARS-COV-2 IGG SERPL QL IA: NEGATIVE
SODIUM SERPL-SCNC: 138 MMOL/L
TRIGL SERPL-MCNC: 364 MG/DL
TSH SERPL-ACNC: 2.44 UIU/ML
WBC # FLD AUTO: 6.79 K/UL

## 2021-02-19 PROBLEM — I25.10 ATHEROSCLEROTIC HEART DISEASE OF NATIVE CORONARY ARTERY WITHOUT ANGINA PECTORIS: Status: ACTIVE | Noted: 2018-03-18

## 2021-02-19 NOTE — PHYSICAL EXAM
[Well Groomed] : well groomed [No Deformities] : no deformities [General Appearance - In No Acute Distress] : no acute distress [Normal Conjunctiva] : the conjunctiva exhibited no abnormalities [Eyelids - No Xanthelasma] : the eyelids demonstrated no xanthelasmas [Normal Jugular Venous A Waves Present] : normal jugular venous A waves present [No Jugular Venous Rothman A Waves] : no jugular venous rothman A waves [Normal Jugular Venous V Waves Present] : normal jugular venous V waves present [Respiration, Rhythm And Depth] : normal respiratory rhythm and effort [Auscultation Breath Sounds / Voice Sounds] : lungs were clear to auscultation bilaterally [Exaggerated Use Of Accessory Muscles For Inspiration] : no accessory muscle use [Heart Rate And Rhythm] : heart rate and rhythm were normal [Heart Sounds] : normal S1 and S2 [Murmurs] : no murmurs present [Arterial Pulses Normal] : the arterial pulses were normal [Edema] : no peripheral edema present [FreeTextEntry1] : obese abdomen    midline  5 cm vertical  scar  [Abnormal Walk] : normal gait [Gait - Sufficient For Exercise Testing] : the gait was sufficient for exercise testing [Nail Clubbing] : no clubbing of the fingernails [Cyanosis, Localized] : no localized cyanosis [Petechial Hemorrhages (___cm)] : no petechial hemorrhages [Skin Color & Pigmentation] : normal skin color and pigmentation [Skin Turgor] : normal skin turgor [] : no rash [No Venous Stasis] : no venous stasis [No Skin Ulcers] : no skin ulcer [No Xanthoma] : no  xanthoma was observed [Mood] : the mood was normal [Oriented To Time, Place, And Person] : oriented to person, place, and time

## 2021-02-19 NOTE — REASON FOR VISIT
[Follow-Up - Clinic] : a clinic follow-up of [Coronary Artery Disease] : coronary artery disease [Hyperlipidemia] : hyperlipidemia [FreeTextEntry1] : 50    year old  female  with CAD, hyperlipidemia and prediabetes presents for evaluation.   \par \par s/p PTCA/bare metal stent of mLAD stenosis  July 2016. Last stress test was April 2018. \par \par \par Mostly sedentary lifestyle \par \par Type 2 diabetes

## 2021-02-19 NOTE — ASSESSMENT
[FreeTextEntry1] : stable CAD\par \par \par BMI 37\par \par \par chronic cervical radiculopathy \par \par prediabetes

## 2021-02-19 NOTE — DISCUSSION/SUMMARY
[Coronary Artery Disease] : coronary artery disease [Stable] : stable [Responding to Treatment] : responding to treatment [Exercise Regimen] : an exercise regimen [None] : none [Weight Reduction] : weight reduction [Hyperlipidemia] : hyperlipidemia [Lipids Test Panel] : a fasting lipid profile [Hypertension] : hypertension [Known CAD] : known coronary artery disease [Family History of CAD] : family history of CAD [FreeTextEntry1] : Tdap administered\par \par venipuncture performed with Covid Ab, A1c, lipids, vitamin D , etc \par \par medications reconciled and renewed\par \par Advised to have Covid 19 vaccine as soon as possible

## 2021-02-19 NOTE — HISTORY OF PRESENT ILLNESS
[FreeTextEntry1] : No mery  syncope, orthopnea, hematuria or rectal bleeding. \par \par Despite multiple comorbidities , she refuses gastric bypass. \par \par BMI 37 \par \par No hx of Covid 19 infection\par \par Requests Tdap today \par \par Numbness in hands  and chronic cervical neck pain \par \par \par Giant uterine fibroid resected in 2019 (not cancerous) \par \par Negative pharmacologic nuclear stress test this past November\par \par EKG shows NSR  83 bpm  without ectopy, ischemia or LVH\par \par

## 2021-02-19 NOTE — REVIEW OF SYSTEMS
[Recent Weight Gain (___ Lbs)] : no recent weight gain [Dyspnea on exertion] : dyspnea during exertion [Chest  Pressure] : no chest pressure [Lower Ext Edema] : no extremity edema [Leg Claudication] : no intermittent leg claudication [Palpitations] : no palpitations [Abdominal Pain] : no abdominal pain [Heartburn] : no heartburn [Dysphagia] : no dysphagia [Numbness (Hypesthesia)] : numbness [Memory Lapses Or Loss] : no memory lapses or loss [Confusion] : no confusion was observed [Depression] : no depression [Anxiety] : anxiety [Under Stress] : not under stress [Suicidal] : not suicidal [Negative] : Heme/Lymph

## 2021-03-09 ENCOUNTER — RX RENEWAL (OUTPATIENT)
Age: 51
End: 2021-03-09

## 2021-05-12 ENCOUNTER — RX RENEWAL (OUTPATIENT)
Age: 51
End: 2021-05-12

## 2021-05-13 ENCOUNTER — RX RENEWAL (OUTPATIENT)
Age: 51
End: 2021-05-13

## 2021-06-21 ENCOUNTER — APPOINTMENT (OUTPATIENT)
Dept: HEART AND VASCULAR | Facility: CLINIC | Age: 51
End: 2021-06-21
Payer: MEDICARE

## 2021-06-21 VITALS
HEIGHT: 64 IN | SYSTOLIC BLOOD PRESSURE: 124 MMHG | DIASTOLIC BLOOD PRESSURE: 78 MMHG | BODY MASS INDEX: 36.54 KG/M2 | TEMPERATURE: 98.6 F | RESPIRATION RATE: 15 BRPM | OXYGEN SATURATION: 98 % | WEIGHT: 214 LBS | HEART RATE: 97 BPM

## 2021-06-21 DIAGNOSIS — R07.89 OTHER CHEST PAIN: ICD-10-CM

## 2021-06-21 DIAGNOSIS — K21.9 GASTRO-ESOPHAGEAL REFLUX DISEASE W/OUT ESOPHAGITIS: ICD-10-CM

## 2021-06-21 PROCEDURE — 93000 ELECTROCARDIOGRAM COMPLETE: CPT

## 2021-06-21 PROCEDURE — 36415 COLL VENOUS BLD VENIPUNCTURE: CPT

## 2021-06-21 PROCEDURE — 99214 OFFICE O/P EST MOD 30 MIN: CPT

## 2021-06-23 LAB
25(OH)D3 SERPL-MCNC: 38.8 NG/ML
ALBUMIN SERPL ELPH-MCNC: 4.6 G/DL
ALP BLD-CCNC: 54 U/L
ALT SERPL-CCNC: 52 U/L
ANION GAP SERPL CALC-SCNC: 13 MMOL/L
APPEARANCE: CLEAR
AST SERPL-CCNC: 34 U/L
BASOPHILS # BLD AUTO: 0.06 K/UL
BASOPHILS NFR BLD AUTO: 0.8 %
BILIRUB SERPL-MCNC: 0.2 MG/DL
BILIRUBIN URINE: NEGATIVE
BLOOD URINE: NEGATIVE
BUN SERPL-MCNC: 12 MG/DL
CALCIUM SERPL-MCNC: 10 MG/DL
CHLORIDE SERPL-SCNC: 102 MMOL/L
CHOLEST SERPL-MCNC: 212 MG/DL
CO2 SERPL-SCNC: 22 MMOL/L
COLOR: NORMAL
COVID-19 NUCLEOCAPSID  GAM ANTIBODY INTERPRETATION: NEGATIVE
COVID-19 SPIKE DOMAIN ANTIBODY INTERPRETATION: POSITIVE
CREAT SERPL-MCNC: 0.79 MG/DL
CREAT SPEC-SCNC: 55 MG/DL
EOSINOPHIL # BLD AUTO: 0.1 K/UL
EOSINOPHIL NFR BLD AUTO: 1.3 %
ESTIMATED AVERAGE GLUCOSE: 128 MG/DL
GLUCOSE QUALITATIVE U: NEGATIVE
GLUCOSE SERPL-MCNC: 190 MG/DL
HBA1C MFR BLD HPLC: 6.1 %
HCT VFR BLD CALC: 38.9 %
HDLC SERPL-MCNC: 37 MG/DL
HGB BLD-MCNC: 12.3 G/DL
IMM GRANULOCYTES NFR BLD AUTO: 0.4 %
KETONES URINE: NEGATIVE
LDLC SERPL CALC-MCNC: 105 MG/DL
LEUKOCYTE ESTERASE URINE: NEGATIVE
LYMPHOCYTES # BLD AUTO: 2.48 K/UL
LYMPHOCYTES NFR BLD AUTO: 31.3 %
MAN DIFF?: NORMAL
MCHC RBC-ENTMCNC: 30 PG
MCHC RBC-ENTMCNC: 31.6 GM/DL
MCV RBC AUTO: 94.9 FL
MICROALBUMIN 24H UR DL<=1MG/L-MCNC: <1.2 MG/DL
MICROALBUMIN/CREAT 24H UR-RTO: NORMAL MG/G
MONOCYTES # BLD AUTO: 0.38 K/UL
MONOCYTES NFR BLD AUTO: 4.8 %
NEUTROPHILS # BLD AUTO: 4.88 K/UL
NEUTROPHILS NFR BLD AUTO: 61.4 %
NITRITE URINE: NEGATIVE
NONHDLC SERPL-MCNC: 176 MG/DL
PH URINE: 5.5
PLATELET # BLD AUTO: 413 K/UL
POTASSIUM SERPL-SCNC: 4.5 MMOL/L
PROT SERPL-MCNC: 7.2 G/DL
PROTEIN URINE: NEGATIVE
RBC # BLD: 4.1 M/UL
RBC # FLD: 13.2 %
SARS-COV-2 AB SERPL IA-ACNC: 159 U/ML
SARS-COV-2 AB SERPL QL IA: 0.1 INDEX
SODIUM SERPL-SCNC: 138 MMOL/L
SPECIFIC GRAVITY URINE: 1.01
TRIGL SERPL-MCNC: 354 MG/DL
TSH SERPL-ACNC: 2.35 UIU/ML
UROBILINOGEN URINE: NORMAL
WBC # FLD AUTO: 7.93 K/UL

## 2021-07-06 ENCOUNTER — APPOINTMENT (OUTPATIENT)
Dept: DERMATOLOGY | Facility: CLINIC | Age: 51
End: 2021-07-06

## 2021-07-13 ENCOUNTER — APPOINTMENT (OUTPATIENT)
Dept: DERMATOLOGY | Facility: CLINIC | Age: 51
End: 2021-07-13
Payer: MEDICARE

## 2021-07-13 DIAGNOSIS — L65.0 TELOGEN EFFLUVIUM: ICD-10-CM

## 2021-07-13 PROCEDURE — 99214 OFFICE O/P EST MOD 30 MIN: CPT

## 2021-07-19 NOTE — REASON FOR VISIT
[Symptom and Test Evaluation] : symptom and test evaluation [Hyperlipidemia] : hyperlipidemia [Coronary Artery Disease] : coronary artery disease [Parent] : parent [FreeTextEntry1] : 50    year old  female  with CAD, hyperlipidemia and prediabetes presents for evaluation.   \par \par s/p PTCA/bare metal stent of mLAD stenosis  July 2016. Last stress test was April 2018. \par \par \par Mostly sedentary lifestyle \par \par Type 2 diabetes

## 2021-07-19 NOTE — REVIEW OF SYSTEMS
[Weight Loss (___ Lbs)] : [unfilled] ~Ulb weight loss [Joint Pain] : joint pain [Joint Stiffness] : joint stiffness [Anxiety] : anxiety [Under Stress] : under stress [Negative] : Heme/Lymph

## 2021-07-19 NOTE — PHYSICAL EXAM
[Well Developed] : well developed [Well Nourished] : well nourished [No Acute Distress] : no acute distress [Obese] : obese [Normal Conjunctiva] : normal conjunctiva [No Xanthelasma] : no xanthelasma [Normal Venous Pressure] : normal venous pressure [No Carotid Bruit] : no carotid bruit [Normal S1, S2] : normal S1, S2 [No Murmur] : no murmur [No Rub] : no rub [No Gallop] : no gallop [Clear Lung Fields] : clear lung fields [Good Air Entry] : good air entry [No Respiratory Distress] : no respiratory distress  [Soft] : abdomen soft [Non Tender] : non-tender [No Masses/organomegaly] : no masses/organomegaly [Normal Bowel Sounds] : normal bowel sounds [Normal Gait] : normal gait [Gait - Sufficient for Exercise Testing] : gait - sufficient for exercise testing [No Edema] : no edema [No Cyanosis] : no cyanosis [No Clubbing] : no clubbing [No Varicosities] : no varicosities [No Rash] : no rash [No Skin Lesions] : no skin lesions [Moves all extremities] : moves all extremities [No Focal Deficits] : no focal deficits [Normal Speech] : normal speech [Alert and Oriented] : alert and oriented [Normal memory] : normal memory [de-identified] : anicteric

## 2021-07-19 NOTE — ASSESSMENT
[FreeTextEntry1] : stable CAD\par \par BMI 37\par \par hypothyroidism \par \par type 2 diabetes\par \par

## 2021-07-19 NOTE — HISTORY OF PRESENT ILLNESS
[FreeTextEntry1] : reports food poisoning last week , no fevers. Diarrhea and vomiting lasted 12 hours\par \par s/p Pfizer covid vaccine x 2\par \par c/o left sided shoulder pains and is known to have cervical disc disease\par \par Treated with epidurals previously \par \par Hx of low vitamin D  and mild transaminitis \par \par Negative nuclear stress test November 2020\par \par EKG today shows NSR 85 bpm  without  ectopy, ischemia or LVH \par \par Denies exertional chest discomfort, sustained palpitations, orthopnea\par \par Refuses bariatric surgery , BMI 37

## 2021-07-19 NOTE — DISCUSSION/SUMMARY
[Coronary Artery Disease] : coronary artery disease [Stable] : stable [None] : There are no changes in medication management [Hyperlipidemia] : hyperlipidemia [Lipids Test Panel] : a fasting lipid profile [Known CAD] : known coronary artery disease [Family History of CAD] : family history of CAD [Low HDL] : low HDL [With Me] : with me [___ Month(s)] : in [unfilled] month(s) [FreeTextEntry1] : With multiple comorbidities, this patient would be an excellent candidate for Fraxiga \par \par venipuncture with A1c, lipids, Covid Ab, et c\par \par Target LDL less than 70 mg/dL \par \par

## 2021-07-22 RX ORDER — DAPAGLIFLOZIN 5 MG/1
5 TABLET, FILM COATED ORAL
Qty: 90 | Refills: 1 | Status: DISCONTINUED | COMMUNITY
Start: 2021-07-19 | End: 2021-07-22

## 2021-10-11 ENCOUNTER — RX RENEWAL (OUTPATIENT)
Age: 51
End: 2021-10-11

## 2021-11-14 ENCOUNTER — RX RENEWAL (OUTPATIENT)
Age: 51
End: 2021-11-14

## 2021-11-29 DIAGNOSIS — J20.9 ACUTE BRONCHITIS, UNSPECIFIED: ICD-10-CM

## 2021-12-01 LAB — SARS-COV-2 N GENE NPH QL NAA+PROBE: NOT DETECTED

## 2021-12-13 ENCOUNTER — APPOINTMENT (OUTPATIENT)
Dept: HEART AND VASCULAR | Facility: CLINIC | Age: 51
End: 2021-12-13
Payer: MEDICARE

## 2021-12-13 VITALS
OXYGEN SATURATION: 98 % | DIASTOLIC BLOOD PRESSURE: 70 MMHG | HEART RATE: 95 BPM | SYSTOLIC BLOOD PRESSURE: 128 MMHG | TEMPERATURE: 98.3 F | HEIGHT: 64 IN

## 2021-12-13 DIAGNOSIS — Z86.19 PERSONAL HISTORY OF OTHER INFECTIOUS AND PARASITIC DISEASES: ICD-10-CM

## 2021-12-13 DIAGNOSIS — E11.9 TYPE 2 DIABETES MELLITUS W/OUT COMPLICATIONS: ICD-10-CM

## 2021-12-13 DIAGNOSIS — E66.9 OBESITY, UNSPECIFIED: ICD-10-CM

## 2021-12-13 PROCEDURE — 36415 COLL VENOUS BLD VENIPUNCTURE: CPT

## 2021-12-13 PROCEDURE — 99212 OFFICE O/P EST SF 10 MIN: CPT

## 2021-12-16 LAB
25(OH)D3 SERPL-MCNC: 27.6 NG/ML
ALBUMIN SERPL ELPH-MCNC: 4.6 G/DL
ALP BLD-CCNC: 54 U/L
ALT SERPL-CCNC: 46 U/L
ANION GAP SERPL CALC-SCNC: 15 MMOL/L
APPEARANCE: CLEAR
AST SERPL-CCNC: 30 U/L
BASOPHILS # BLD AUTO: 0.04 K/UL
BASOPHILS NFR BLD AUTO: 0.5 %
BILIRUB SERPL-MCNC: 0.2 MG/DL
BILIRUBIN URINE: NEGATIVE
BLOOD URINE: NEGATIVE
BUN SERPL-MCNC: 14 MG/DL
CALCIUM SERPL-MCNC: 9.7 MG/DL
CHLORIDE SERPL-SCNC: 101 MMOL/L
CHOLEST SERPL-MCNC: 183 MG/DL
CO2 SERPL-SCNC: 20 MMOL/L
COLOR: YELLOW
CREAT SERPL-MCNC: 0.75 MG/DL
EOSINOPHIL # BLD AUTO: 0.2 K/UL
EOSINOPHIL NFR BLD AUTO: 2.7 %
ESTIMATED AVERAGE GLUCOSE: 108 MG/DL
GLUCOSE QUALITATIVE U: NEGATIVE
GLUCOSE SERPL-MCNC: 125 MG/DL
HBA1C MFR BLD HPLC: 5.4 %
HCT VFR BLD CALC: 40.6 %
HDLC SERPL-MCNC: 34 MG/DL
HGB BLD-MCNC: 12.3 G/DL
IMM GRANULOCYTES NFR BLD AUTO: 0.4 %
KETONES URINE: NEGATIVE
LDLC SERPL CALC-MCNC: NORMAL MG/DL
LEUKOCYTE ESTERASE URINE: NEGATIVE
LYMPHOCYTES # BLD AUTO: 2.36 K/UL
LYMPHOCYTES NFR BLD AUTO: 31.8 %
MAN DIFF?: NORMAL
MCHC RBC-ENTMCNC: 29.4 PG
MCHC RBC-ENTMCNC: 30.3 GM/DL
MCV RBC AUTO: 96.9 FL
MONOCYTES # BLD AUTO: 0.57 K/UL
MONOCYTES NFR BLD AUTO: 7.7 %
NEUTROPHILS # BLD AUTO: 4.22 K/UL
NEUTROPHILS NFR BLD AUTO: 56.9 %
NITRITE URINE: NEGATIVE
NONHDLC SERPL-MCNC: 149 MG/DL
PH URINE: 5.5
PLATELET # BLD AUTO: 362 K/UL
POTASSIUM SERPL-SCNC: 4.3 MMOL/L
PROT SERPL-MCNC: 7 G/DL
PROTEIN URINE: NEGATIVE
RBC # BLD: 4.19 M/UL
RBC # FLD: 13.8 %
SODIUM SERPL-SCNC: 136 MMOL/L
SPECIFIC GRAVITY URINE: 1.03
TRIGL SERPL-MCNC: 454 MG/DL
TSH SERPL-ACNC: 3.74 UIU/ML
UROBILINOGEN URINE: NORMAL
WBC # FLD AUTO: 7.42 K/UL

## 2021-12-26 PROBLEM — E66.9 OBESITY: Status: ACTIVE | Noted: 2021-07-19

## 2021-12-26 PROBLEM — E11.9 NON-INSULIN DEPENDENT TYPE 2 DIABETES MELLITUS: Status: ACTIVE | Noted: 2021-07-19

## 2021-12-26 PROBLEM — Z86.19 HISTORY OF LYME DISEASE: Status: RESOLVED | Noted: 2021-12-26 | Resolved: 2021-12-26

## 2021-12-26 NOTE — PHYSICAL EXAM
[Well Developed] : well developed [Well Nourished] : well nourished [No Acute Distress] : no acute distress [Obese] : obese [Normal Conjunctiva] : normal conjunctiva [No Xanthelasma] : no xanthelasma [Normal Venous Pressure] : normal venous pressure [No Carotid Bruit] : no carotid bruit [Normal S1, S2] : normal S1, S2 [No Murmur] : no murmur [No Rub] : no rub [No Gallop] : no gallop [Clear Lung Fields] : clear lung fields [Good Air Entry] : good air entry [No Respiratory Distress] : no respiratory distress  [Soft] : abdomen soft [Non Tender] : non-tender [No Masses/organomegaly] : no masses/organomegaly [Normal Bowel Sounds] : normal bowel sounds [Normal Gait] : normal gait [Gait - Sufficient for Exercise Testing] : gait - sufficient for exercise testing [No Edema] : no edema [No Cyanosis] : no cyanosis [No Clubbing] : no clubbing [No Varicosities] : no varicosities [No Rash] : no rash [No Skin Lesions] : no skin lesions [Moves all extremities] : moves all extremities [No Focal Deficits] : no focal deficits [Normal Speech] : normal speech [Alert and Oriented] : alert and oriented [Normal memory] : normal memory [de-identified] : anicteric

## 2021-12-26 NOTE — REASON FOR VISIT
[Hyperlipidemia] : hyperlipidemia [Coronary Artery Disease] : coronary artery disease [Parent] : parent [FreeTextEntry1] : 51     year old  female  with CAD, hyperlipidemia and prediabetes presents for  follow up evaluation.   \par \par s/p PTCA/bare metal stent of mLAD stenosis  July 2016. Last stress test was April 2018. \par \par \par Mostly sedentary lifestyle \par \par Type 2 diabetes

## 2021-12-26 NOTE — REVIEW OF SYSTEMS
[Weight Loss (___ Lbs)] : no recent weight loss [Joint Pain] : joint pain [Joint Stiffness] : joint stiffness [Anxiety] : anxiety [Under Stress] : under stress [Negative] : Heme/Lymph

## 2021-12-26 NOTE — ASSESSMENT
[FreeTextEntry1] : CAD   hx of PTCA/NEETU of mLAD\par \par dyslipidemia \par \par \par type 2 diabetes \par \par \par BMI 37\par \par

## 2021-12-26 NOTE — DISCUSSION/SUMMARY
[Coronary Artery Disease] : coronary artery disease [Stable] : stable [Lipid Profile] : lipid profile [Hyperlipidemia] : hyperlipidemia [Lipids Test Panel] : a fasting lipid profile [Known CAD] : known coronary artery disease [Diabetes Mellitus] : diabetes mellitus [Family History of CAD] : family history of CAD [Low HDL] : low HDL [<70] : goal is <70 [<150] : goal is <150 [>40] : goal is >40 [At Goal] : The HDL is not at goal [With Me] : with me [___ Month(s)] : in [unfilled] month(s) [FreeTextEntry1] : venipuncture performed  with lipids, A1c, etc \par \par medications reconciled\par \par advised to have flu vaccine (we are out of low dose )

## 2021-12-26 NOTE — HISTORY OF PRESENT ILLNESS
[FreeTextEntry1] : No   personal hx of covid, she has been fully vaccinated with booster \par \par Needs flu vaccine \par \par Last nuclear stress test  was  November 2020 - nonischemic \par \par Normal LVEF  without significant  valvular pathology \par \par Needs blood work today

## 2022-01-10 ENCOUNTER — RX RENEWAL (OUTPATIENT)
Age: 52
End: 2022-01-10

## 2022-02-03 ENCOUNTER — RX RENEWAL (OUTPATIENT)
Age: 52
End: 2022-02-03

## 2022-03-09 ENCOUNTER — RX RENEWAL (OUTPATIENT)
Age: 52
End: 2022-03-09

## 2022-04-07 ENCOUNTER — APPOINTMENT (OUTPATIENT)
Dept: ENDOCRINOLOGY | Facility: CLINIC | Age: 52
End: 2022-04-07
Payer: MEDICARE

## 2022-04-07 PROCEDURE — 99214 OFFICE O/P EST MOD 30 MIN: CPT | Mod: 95

## 2022-04-07 RX ORDER — ORAL SEMAGLUTIDE 3 MG/1
3 TABLET ORAL
Qty: 30 | Refills: 0 | Status: COMPLETED | COMMUNITY
Start: 2022-04-07 | End: 2022-05-07

## 2022-04-07 RX ORDER — LEVOFLOXACIN 750 MG/1
750 TABLET, FILM COATED ORAL DAILY
Qty: 7 | Refills: 0 | Status: DISCONTINUED | COMMUNITY
Start: 2021-11-29 | End: 2022-04-07

## 2022-04-07 NOTE — PHYSICAL EXAM
[Alert] : alert [Healthy Appearance] : healthy appearance [No Acute Distress] : no acute distress [Normal Sclera/Conjunctiva] : normal sclera/conjunctiva [No Respiratory Distress] : no respiratory distress [Normal Insight/Judgement] : insight and judgment were intact [Normal Hearing] : hearing was normal

## 2022-04-11 PROBLEM — J04.0 REFLUX LARYNGITIS: Status: ACTIVE | Noted: 2019-11-11

## 2022-04-11 PROBLEM — J04.0 REFLUX LARYNGITIS: Status: RESOLVED | Noted: 2019-01-10 | Resolved: 2022-04-11

## 2022-05-27 ENCOUNTER — APPOINTMENT (OUTPATIENT)
Dept: DERMATOLOGY | Facility: CLINIC | Age: 52
End: 2022-05-27

## 2022-06-08 RX ORDER — LANCETS 33 GAUGE
EACH MISCELLANEOUS
Qty: 1 | Refills: 3 | Status: ACTIVE | COMMUNITY
Start: 2022-04-07 | End: 1900-01-01

## 2022-06-08 RX ORDER — BLOOD-GLUCOSE METER
KIT MISCELLANEOUS
Qty: 1 | Refills: 3 | Status: ACTIVE | COMMUNITY
Start: 2022-04-07 | End: 1900-01-01

## 2022-06-08 RX ORDER — ORAL SEMAGLUTIDE 3 MG/1
3 TABLET ORAL
Qty: 30 | Refills: 0 | Status: COMPLETED | COMMUNITY
Start: 2022-06-08 | End: 2022-07-08

## 2022-06-08 RX ORDER — BLOOD-GLUCOSE METER
W/DEVICE KIT MISCELLANEOUS
Qty: 1 | Refills: 0 | Status: COMPLETED | COMMUNITY
Start: 2022-04-07 | End: 2022-09-06

## 2022-06-09 ENCOUNTER — APPOINTMENT (OUTPATIENT)
Dept: DERMATOLOGY | Facility: CLINIC | Age: 52
End: 2022-06-09
Payer: MEDICARE

## 2022-06-09 DIAGNOSIS — D22.9 MELANOCYTIC NEVI, UNSPECIFIED: ICD-10-CM

## 2022-06-09 DIAGNOSIS — L98.9 DISORDER OF THE SKIN AND SUBCUTANEOUS TISSUE, UNSPECIFIED: ICD-10-CM

## 2022-06-09 PROCEDURE — 88104 CYTOPATH FL NONGYN SMEARS: CPT

## 2022-06-09 PROCEDURE — 99213 OFFICE O/P EST LOW 20 MIN: CPT | Mod: 25

## 2022-06-09 NOTE — HISTORY OF PRESENT ILLNESS
[FreeTextEntry1] : rash [de-identified] : estab patient\par saw me in 2019 then Jesus Alberto\par concerned about reaction to 4th pfizer vaccine - delayed, itchy pain doctor was concerned about thrombosis\par now resolved \par no hx skin cancer\par \par rash on buttocks, hct oint not helping - itchy since reaction to booster but not normally

## 2022-06-09 NOTE — PHYSICAL EXAM
[Alert] : alert [Oriented x 3] : ~L oriented x 3 [Well Nourished] : well nourished [Conjunctiva Non-injected] : conjunctiva non-injected [No Visual Lymphadenopathy] : no visual  lymphadenopathy [No Clubbing] : no clubbing [No Edema] : no edema [No Bromhidrosis] : no bromhidrosis [No Chromhidrosis] : no chromhidrosis [Full Body Skin Exam Performed] : performed [FreeTextEntry3] : white skin\par scattered brown macules and papules\par pink excoriated papules along medial buttocks along with a few ulcerations sacral area

## 2022-06-11 ENCOUNTER — NON-APPOINTMENT (OUTPATIENT)
Age: 52
End: 2022-06-11

## 2022-06-13 ENCOUNTER — APPOINTMENT (OUTPATIENT)
Dept: HEART AND VASCULAR | Facility: CLINIC | Age: 52
End: 2022-06-13
Payer: MEDICARE

## 2022-06-13 DIAGNOSIS — Z95.5 PRESENCE OF CORONARY ANGIOPLASTY IMPLANT AND GRAFT: ICD-10-CM

## 2022-06-13 PROCEDURE — 78452 HT MUSCLE IMAGE SPECT MULT: CPT

## 2022-06-13 PROCEDURE — 93015 CV STRESS TEST SUPVJ I&R: CPT

## 2022-06-13 PROCEDURE — A9500: CPT

## 2022-06-13 PROCEDURE — 36415 COLL VENOUS BLD VENIPUNCTURE: CPT

## 2022-06-15 ENCOUNTER — RX RENEWAL (OUTPATIENT)
Age: 52
End: 2022-06-15

## 2022-06-15 LAB
ALBUMIN SERPL ELPH-MCNC: 4.8 G/DL
ALP BLD-CCNC: 48 U/L
ALT SERPL-CCNC: 47 U/L
ANION GAP SERPL CALC-SCNC: 20 MMOL/L
AST SERPL-CCNC: 44 U/L
BASOPHILS # BLD AUTO: 0.04 K/UL
BASOPHILS NFR BLD AUTO: 0.5 %
BILIRUB SERPL-MCNC: 0.2 MG/DL
BUN SERPL-MCNC: 15 MG/DL
CALCIUM SERPL-MCNC: 9.8 MG/DL
CHLORIDE SERPL-SCNC: 99 MMOL/L
CHOLEST SERPL-MCNC: 220 MG/DL
CO2 SERPL-SCNC: 17 MMOL/L
CREAT SERPL-MCNC: 0.96 MG/DL
EGFR: 72 ML/MIN/1.73M2
EOSINOPHIL # BLD AUTO: 0.13 K/UL
EOSINOPHIL NFR BLD AUTO: 1.6 %
ESTIMATED AVERAGE GLUCOSE: 160 MG/DL
GLUCOSE SERPL-MCNC: 109 MG/DL
HBA1C MFR BLD HPLC: 7.2 %
HCT VFR BLD CALC: 42.9 %
HDLC SERPL-MCNC: 37 MG/DL
HGB BLD-MCNC: 12.9 G/DL
IMM GRANULOCYTES NFR BLD AUTO: 0.2 %
INSULIN P FAST SERPL-ACNC: 47.1 UU/ML
LDLC SERPL CALC-MCNC: 113 MG/DL
LYMPHOCYTES # BLD AUTO: 2.6 K/UL
LYMPHOCYTES NFR BLD AUTO: 31.9 %
MAN DIFF?: NORMAL
MCHC RBC-ENTMCNC: 28.9 PG
MCHC RBC-ENTMCNC: 30.1 GM/DL
MCV RBC AUTO: 96.2 FL
MONOCYTES # BLD AUTO: 0.61 K/UL
MONOCYTES NFR BLD AUTO: 7.5 %
NEUTROPHILS # BLD AUTO: 4.75 K/UL
NEUTROPHILS NFR BLD AUTO: 58.3 %
NONHDLC SERPL-MCNC: 183 MG/DL
PLATELET # BLD AUTO: 403 K/UL
POTASSIUM SERPL-SCNC: 5.3 MMOL/L
PROT SERPL-MCNC: 7.7 G/DL
RBC # BLD: 4.46 M/UL
RBC # FLD: 13.6 %
SODIUM SERPL-SCNC: 136 MMOL/L
TRIGL SERPL-MCNC: 351 MG/DL
WBC # FLD AUTO: 8.15 K/UL

## 2022-06-16 ENCOUNTER — RX RENEWAL (OUTPATIENT)
Age: 52
End: 2022-06-16

## 2022-06-23 LAB
HSV+VZV DNA SPEC QL NAA+PROBE: NOT DETECTED
SPECIMEN SOURCE: NORMAL

## 2022-07-19 ENCOUNTER — APPOINTMENT (OUTPATIENT)
Dept: AFTER HOURS CARE | Facility: EMERGENCY ROOM | Age: 52
End: 2022-07-19

## 2022-07-19 DIAGNOSIS — U07.1 COVID-19: ICD-10-CM

## 2022-07-19 PROCEDURE — 99203 OFFICE O/P NEW LOW 30 MIN: CPT | Mod: CS,95

## 2022-07-26 PROBLEM — U07.1 COVID-19: Status: ACTIVE | Noted: 2022-07-26

## 2022-07-26 NOTE — ASSESSMENT
[FreeTextEntry1] : COVID+, mild symptoms, D4.  Likely does not require antivirals or MAB given improving.  Long shared decision conversation c patient; she will evaluate her symptoms and call back as needed

## 2022-07-26 NOTE — HISTORY OF PRESENT ILLNESS
[Home] : at home, [unfilled] , at the time of the visit. [Other Location: e.g. Home (Enter Location, City,State)___] : at [unfilled] [Mother] : mother [FreeTextEntry8] : COVID+ 2 days ago.  4 days of symptoms\par Cough, fatigue, +anosmia\par No SOB, n/v/d\par PMH -- chronic lyme, CAD sp stent on ASA, hypothyroid, gerd\par PSH -- pci \par PCN allergy\par \par Unremarkable PE

## 2022-08-01 ENCOUNTER — APPOINTMENT (OUTPATIENT)
Dept: DERMATOLOGY | Facility: CLINIC | Age: 52
End: 2022-08-01

## 2022-08-01 ENCOUNTER — LABORATORY RESULT (OUTPATIENT)
Age: 52
End: 2022-08-01

## 2022-08-01 DIAGNOSIS — D48.7 NEOPLASM OF UNCERTAIN BEHAVIOR OF OTHER SPECIFIED SITES: ICD-10-CM

## 2022-08-01 PROCEDURE — 11104 PUNCH BX SKIN SINGLE LESION: CPT

## 2022-08-03 ENCOUNTER — APPOINTMENT (OUTPATIENT)
Dept: HEART AND VASCULAR | Facility: CLINIC | Age: 52
End: 2022-08-03

## 2022-08-03 ENCOUNTER — APPOINTMENT (OUTPATIENT)
Dept: INTERNAL MEDICINE | Facility: CLINIC | Age: 52
End: 2022-08-03

## 2022-08-03 ENCOUNTER — NON-APPOINTMENT (OUTPATIENT)
Age: 52
End: 2022-08-03

## 2022-08-03 VITALS
OXYGEN SATURATION: 98 % | BODY MASS INDEX: 35.17 KG/M2 | HEIGHT: 64 IN | DIASTOLIC BLOOD PRESSURE: 80 MMHG | RESPIRATION RATE: 15 BRPM | SYSTOLIC BLOOD PRESSURE: 130 MMHG | TEMPERATURE: 96.5 F | HEART RATE: 68 BPM | WEIGHT: 206 LBS

## 2022-08-03 PROCEDURE — 93000 ELECTROCARDIOGRAM COMPLETE: CPT

## 2022-08-03 PROCEDURE — 99213 OFFICE O/P EST LOW 20 MIN: CPT | Mod: 25

## 2022-08-03 RX ORDER — METFORMIN ER 500 MG 500 MG/1
500 TABLET ORAL
Qty: 270 | Refills: 0 | Status: DISCONTINUED | COMMUNITY
Start: 2019-11-13 | End: 2022-08-03

## 2022-08-03 NOTE — HISTORY OF PRESENT ILLNESS
[FreeTextEntry1] : 51 year female who comes for Cardiology evaluation. She is a former patient of Dr Brandon who sees pain management for her back. She denies having any chest pain, SOB, BEDOYA, dizziness, palpitations, orthopnea, PND or syncope. At the time of her visit we updated her medications. She notes weight loss with Rybelsus

## 2022-08-03 NOTE — ASSESSMENT
[FreeTextEntry1] : An EKG was performed to evaluate for arrhythmia and ischemia. \par \par CAD, DM--We discussed that her LDL was not at goal. She has mild elevated LFTs which she wonders if can be attributed to fatty liver. I suggested meeting Dr Eagle of Preventive Cardiology regarding options including PCSK9 inhibitors\par \par Meet Dr Miramontes as PCP and repeat labs at that time\par \par I asked her to return for an Echocardiogram

## 2022-08-05 ENCOUNTER — APPOINTMENT (OUTPATIENT)
Dept: DERMATOLOGY | Facility: CLINIC | Age: 52
End: 2022-08-05

## 2022-08-12 NOTE — HISTORY OF PRESENT ILLNESS
[Home] : at home, [unfilled] , at the time of the visit. [Medical Office: ([facility])___] : at the medical office located in  [Verbal consent obtained from patient] : the patient, [unfilled] [FreeTextEntry1] : Ms. 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 Mal Lyons, elevated body mass index presenting for follow-up of her endocrine issues. I saw her for an initial visit in March 2019 and last in January 2021.\par \par Type 2 diabetes mellitus. HbA1c 5.4% in December 2021, 6.1% in June 2021, 6.3% in February 2021, HbA1c 6.4% in June 2020, 7.0% in February 2020, 6.5% in December 2019. Coronary artery disease. \par She was diagnosed with diabetes in December 2019 by HbA1c. No hospitalizations for hypo- or hyperglycemia.\par We restarted metformin  mg daily in December 2019. She did not tolerate Victoza in the past for weight loss due to issues with self-injection. In December 2019 we started Trulicity but she did not tolerate. In February 2020, we adjusted metformin up to 2000 mg daily and started Jardiance 10 mg daily; she stopped Jardiance due to concern for side effects. She had been off metformin since the fall due to concern for the recall. \par In January 2021 we restarted metformin ER up to 2000 mg daily. \par She is not on a blood pressure regimen\par She is on a statin for cholesterol\par Nephropathy screening: Urine microalbumin within range in June 2021\par Last ophthalmology appointment: Over a year\par Last dental appointment: Over a year\par \par Hypothyroidism.\par She was diagnosed with hypothyroidism around 2012.\par She has been on Synthroid (brand-name) 50 mcg daily since that time.\par She is taking omeprazole first in the morning, on an empty stomach, then is taking levothyroxine about an hour later, with plain water, and waiting at least 30 minutes before eating. She is not taking calcium/iron/multivitamin. She has been on omeprazole for about five years.\par No history of radiation exposure.\par Her mother has a history of hypothyroidism and nontoxic multinodular goiter, and maternal grandmother with goiter.\par \par Interim History \par In January 2021 we restarted metformin ER up to 2000 mg daily. She has had gastrointestinal side effects with therapy, including significant bloating. \par She has lost some weight with lifestyle changes. She is considering a gastric band or sleeve but this is not a priority for her for at this time. \par She has not been as physically active as before; she has not wanted to go back to the gym yet due to the pandemic. \par Her mother has been ill. \par She has had some lower extremity numbness attributed to cervical spine disease. No chest pain or shortness of breath. \par She has seen multiple providers; notes reviewed. Last laboratory results reviewed.\par Medical and surgical history, medications, allergies, social and family history reviewed and updated as needed.

## 2022-08-12 NOTE — ADDENDUM
[FreeTextEntry1] : Ms. Gomez had a significant reaction to her last COVID-19 booster. She stopped Rybelsus due to hypoglycemic symptoms; she was not monitoring blood sugars. I advised she could restart 3 mg daily for one month, then 7 mg daily. I sent prescriptions for a glucometer and supplies to her pharmacy. 6/08/22\par \par Ms. Gomez called that she wanted to increase her dose of Rybelsus to 14 mg daily. 8/12/22

## 2022-08-12 NOTE — ASSESSMENT
[FreeTextEntry1] : Type 2 diabetes mellitus/elevated body mass index. HbA1c 5.4% in December 2021. Coronary artery disease. I congratulated her on her excellent glycemic control. We have discussed the cardiovascular and microvascular complications of diabetes. We discussed the importance of diet and exercise and lifestyle modification for glycemic control and weight loss. She has declined follow-up with nutrition. We discussed pharmacologic options for glycemic control and weight loss. She has had gastrointestinal side effects with metformin. She is amenable to a trial of an oral GLP-1 receptor agonist. We discussed the risks and benefits of the GLP-1 receptor agonist class, including but not limited to nausea and pancreatitis. We discussed proper use and compliance with Rybelsus. She has considered surgical options for weight loss and we can further readdress at subsequent visits. \par Stop metformin and start Rybelsus 3 mg daily for one month if covered by insurance, then 7 mg daily as tolerated\par She is not on a blood pressure regimen; last blood pressure around goal\par She is on a statin for cholesterol; repeat fasting lipid panel\par Nephropathy screening: Urine microalbumin within range in June 2021\par Last ophthalmology appointment: Over a year; advised appointment when appropriate\par Last dental appointment: Over a year; advised appointment when appropriate\par \par Hypothyroidism. She has been biochemically euthyroid on her current regimen of levothyroxine. We have reviewed proper use and compliance with levothyroxine.\par Continue Synthroid 50 mcg daily\par \par Return to see me in 3 months. Patient advised to call earlier with significant hypo- or hyperglycemia.

## 2022-08-25 ENCOUNTER — APPOINTMENT (OUTPATIENT)
Dept: HEART AND VASCULAR | Facility: CLINIC | Age: 52
End: 2022-08-25

## 2022-08-25 VITALS
HEART RATE: 97 BPM | BODY MASS INDEX: 34.31 KG/M2 | SYSTOLIC BLOOD PRESSURE: 123 MMHG | OXYGEN SATURATION: 99 % | HEIGHT: 64 IN | DIASTOLIC BLOOD PRESSURE: 87 MMHG | WEIGHT: 201 LBS

## 2022-08-25 DIAGNOSIS — E78.1 PURE HYPERGLYCERIDEMIA: ICD-10-CM

## 2022-08-25 PROCEDURE — 99214 OFFICE O/P EST MOD 30 MIN: CPT

## 2022-08-25 NOTE — HISTORY OF PRESENT ILLNESS
[FreeTextEntry1] : Ms. Gomez is a 50yo W with CAD s/p PCI (PTCA/BMS of mLAD in 2016), obesity DM HL hypothyroidism and chronic Lyme who was referred to me for HL.\par \par Saw Dr. Flak in 2022. Labs showed mildly elevated LFTs and LDL Above goal. Possible fatty liver. Referred here for consideration of options including PCKS9i. \par \par She was started on LLT in 2016; has chronic Lyme w/ joint issues, so she tries to avoid meds. She gets epidurals for many years. \par \par She reports feeling well today. Patient denies any chest pain, SOB, palpitations, orthopnea, PND or LE edema.\par \par #HL: on fenofibrate 145mg - does not take every day\par -atorvastatin - not taking; she says Dr. Brandon stopped it and switched her to the fibrate about 6 months ago\par -takes OTC fish oil \par -when taking statin, would try to take ~4 days/week bc of joint pain\par -has not tried any other statin\par \par #DM: on Rybelsus, follows with endocrine; reports she has lost 20 lbs since she started about 4 months ago\par -allergy to Jardiance\par \par FH:\par mother has 3 stents; PPM a year ago\par father CABGx3 in the 1980s (reports the men on his side  in their 40s)\par \par SH: not working\par no EToh or drug use\par \par Lifestyle History:\par Mediterranean Diet Score (9 question survey) was 6. Eats mostly organic; avoids red meat and eats chicken and fish; eats lots of fruits and veggies; eats about 4 eggs per week; uses olive oil to cook; only drinks water, occasional diet coke \par (8-9: optimal, 6-7: near-optimal, 4-5: suboptimal, 0-3: markedly suboptimal)\par Exercise: Patient reports exercising at a moderate level for  minutes per week. \par Smoking: social smoker many years ago\par Stress: Patient denies any significant stress. \par \par 1 pregnancy - miscarriage \par had partial hysterectomy 2/2 fibroids; believes she is starting menopause  \par \par 2022 Labs:\par Chol 220\par HDL 37\par \par \par corrected LDL: 133\par Cr 0.96\par AST 44\par ALT 47\par A1c 7.2%\par TSH 3.7 in Dec 2021

## 2022-08-25 NOTE — PHYSICAL EXAM
[Normal S1, S2] : normal S1, S2 [No Murmur] : no murmur [Normal] : clear lung fields, good air entry, no respiratory distress [Soft] : abdomen soft [No Edema] : no edema [No Rash] : no rash [Moves all extremities] : moves all extremities [Alert and Oriented] : alert and oriented

## 2022-08-25 NOTE — DISCUSSION/SUMMARY
[FreeTextEntry1] : Ms. Gomez is a 52yo W with CAD s/p PCI (PTCA/BMS of mLAD in 2016), obesity DM HL hypothyroidism and chronic Lyme who was referred to me for HL.\par \par HL:\par - ordered repeat fasting lipids to decide on statin dosing and Lp(a) to assess for genetic component \par - favor restarting statin given previous PCI -- as had joint pain with atorvastatin, will start with rosuvastatin; other non-statin therapies can be considered such as ezetimibe and/or PCSK9i pending response \par - c/w fenofibrate 145mg daily for now - may benefit more from IPE \par - she reports taking OTC fish oil; would suggest she stop as there is likely no benefit and could be causing harm \par - discussed effect of diet/exercise on triglycerides - limiting etoh, saturated fat, added sugar\par \par Continue follow-up with Dr. Falk. Follow-up here in 3 months to assess response to lipid therapies. \par

## 2022-08-26 ENCOUNTER — NON-APPOINTMENT (OUTPATIENT)
Age: 52
End: 2022-08-26

## 2022-08-26 LAB
ALBUMIN SERPL ELPH-MCNC: 4.6 G/DL
ALP BLD-CCNC: 55 U/L
ALT SERPL-CCNC: 47 U/L
ANION GAP SERPL CALC-SCNC: 12 MMOL/L
APO LP(A) SERPL-MCNC: 162.6 NMOL/L
AST SERPL-CCNC: 33 U/L
BILIRUB SERPL-MCNC: 0.3 MG/DL
BUN SERPL-MCNC: 15 MG/DL
CALCIUM SERPL-MCNC: 10.2 MG/DL
CHLORIDE SERPL-SCNC: 102 MMOL/L
CHOLEST SERPL-MCNC: 243 MG/DL
CO2 SERPL-SCNC: 24 MMOL/L
CREAT SERPL-MCNC: 0.89 MG/DL
EGFR: 78 ML/MIN/1.73M2
GLUCOSE SERPL-MCNC: 96 MG/DL
HDLC SERPL-MCNC: 41 MG/DL
LDLC SERPL CALC-MCNC: 167 MG/DL
NONHDLC SERPL-MCNC: 202 MG/DL
POTASSIUM SERPL-SCNC: 4.7 MMOL/L
PROT SERPL-MCNC: 7.4 G/DL
SODIUM SERPL-SCNC: 138 MMOL/L
TRIGL SERPL-MCNC: 175 MG/DL

## 2022-09-26 ENCOUNTER — APPOINTMENT (OUTPATIENT)
Dept: ENDOCRINOLOGY | Facility: CLINIC | Age: 52
End: 2022-09-26

## 2022-09-26 PROCEDURE — 99214 OFFICE O/P EST MOD 30 MIN: CPT | Mod: 95

## 2022-09-26 NOTE — HISTORY OF PRESENT ILLNESS
[FreeTextEntry1] : Ms. 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 Mal Lyons, elevated body mass index presenting for follow-up of her endocrine issues. I saw her for an initial visit in March 2019 and last in April 2022 as a telehealth visit.\par \par Type 2 diabetes mellitus. HbA1c 7.2% in June 2022, 5.4% in December 2021, 6.1% in June 2021, 6.3% in February 2021, HbA1c 6.4% in June 2020, 7.0% in February 2020, 6.5% in December 2019. Coronary artery disease. \par She was diagnosed with diabetes in December 2019 by HbA1c. No hospitalizations for hypo- or hyperglycemia.\par We restarted metformin  mg daily in December 2019. She did not tolerate Victoza in the past for weight loss due to issues with self-injection. In December 2019 we started Trulicity but she did not tolerate. In February 2020, we adjusted metformin up to 2000 mg daily and started Jardiance 10 mg daily; she stopped Jardiance due to concern for side effects. She had been off metformin since the fall due to concern for the recall. \par In January 2021 we restarted metformin ER up to 2000 mg daily. \par In April 2022 we discontinued metformin ER 2000 mg daily due to side effects and started Rybelsus 3 mg daily up to 14 mg daily.\par She is currently taking Rybelsus 14 mg daily.\par She is not on a blood pressure regimen\par She is not on a statin for cholesterol\par Nephropathy screening: Due\par Last ophthalmology appointment: Over a year\par Last dental appointment: Over a year\par \par Hypothyroidism.\par She was diagnosed with hypothyroidism around 2012.\par She has been on Synthroid (brand-name) 50 mcg daily since that time.\par She is taking omeprazole first in the morning, on an empty stomach, then is taking levothyroxine about an hour later, with plain water, and waiting at least 30 minutes before eating. She is not taking calcium/iron/multivitamin. She has been on omeprazole for about five years.\par No history of radiation exposure.\par Her mother has a history of hypothyroidism and nontoxic multinodular goiter, and maternal grandmother with goiter.\par \par Interim History \par In April 2022 we discontinued metformin ER 2000 mg daily due to side effects and started Rybelsus 3 mg daily up to 14 mg daily; we adjusted her dose after her last hemoglobin A1c level.\par She is currently taking Rybelsus 14 mg daily.\par She wants to resume going to the gym. \par Her mother has been ill and has required care. \par She has seen multiple providers; notes reviewed. Last laboratory results reviewed.\par Weight is down 23 pounds from her highest weight of 224 pounds. No chest pain or shortness of breath. \par Medical and surgical history, medications, allergies, social and family history reviewed and updated as needed.  [Home] : at home, [unfilled] , at the time of the visit. [Medical Office: ([facility])___] : at the medical office located in  [Verbal consent obtained from patient] : the patient, [unfilled]

## 2022-09-26 NOTE — ASSESSMENT
[FreeTextEntry1] : Type 2 diabetes mellitus/elevated body mass index. HbA1c 7.2% in June 2022. Coronary artery disease. I congratulated her on her glycemic control close to goal and overall weight loss. We have discussed the cardiovascular and microvascular complications of diabetes. We discussed the importance of diet and exercise and lifestyle modification for glycemic control and weight loss. She has declined follow-up with nutrition. We discussed pharmacologic options for glycemic control and weight loss. She is tolerating her current regimen and we will continue. She has considered surgical options for weight loss but does not want to pursue at this time. \par Check complete blood count, comprehensive metabolic panel, urine microalbumin, vitamin B12, 25-hydroxyvitamin D \par Continue Rybelsus 14 mg daily\par She is not on a blood pressure regimen; last blood pressure around goal\par She is not on a statin for cholesterol; she will follow with cardiology\par Nephropathy screening: Due\par Last ophthalmology appointment: Over a year; advised appointment when appropriate\par Last dental appointment: Over a year; advised appointment when appropriate\par \par Hypothyroidism. She has been biochemically euthyroid on her current regimen of levothyroxine. We have reviewed proper use and compliance with levothyroxine.\par Continue Synthroid 50 mcg daily pending TSH level\par \par Return to see me in 4 months. Patient advised to call earlier with significant hypo- or hyperglycemia.

## 2022-11-17 ENCOUNTER — APPOINTMENT (OUTPATIENT)
Dept: HEART AND VASCULAR | Facility: CLINIC | Age: 52
End: 2022-11-17

## 2022-12-21 ENCOUNTER — NON-APPOINTMENT (OUTPATIENT)
Age: 52
End: 2022-12-21

## 2023-01-11 ENCOUNTER — APPOINTMENT (OUTPATIENT)
Dept: HEART AND VASCULAR | Facility: CLINIC | Age: 53
End: 2023-01-11

## 2023-01-20 ENCOUNTER — NON-APPOINTMENT (OUTPATIENT)
Age: 53
End: 2023-01-20

## 2023-01-23 ENCOUNTER — APPOINTMENT (OUTPATIENT)
Dept: HEART AND VASCULAR | Facility: CLINIC | Age: 53
End: 2023-01-23

## 2023-01-24 ENCOUNTER — APPOINTMENT (OUTPATIENT)
Dept: HEART AND VASCULAR | Facility: CLINIC | Age: 53
End: 2023-01-24
Payer: MEDICARE

## 2023-01-24 ENCOUNTER — NON-APPOINTMENT (OUTPATIENT)
Age: 53
End: 2023-01-24

## 2023-01-24 VITALS
SYSTOLIC BLOOD PRESSURE: 94 MMHG | HEIGHT: 64 IN | TEMPERATURE: 98.1 F | BODY MASS INDEX: 31.76 KG/M2 | HEART RATE: 95 BPM | RESPIRATION RATE: 16 BRPM | OXYGEN SATURATION: 98 % | DIASTOLIC BLOOD PRESSURE: 68 MMHG | WEIGHT: 186 LBS

## 2023-01-24 DIAGNOSIS — R00.2 PALPITATIONS: ICD-10-CM

## 2023-01-24 PROCEDURE — 99214 OFFICE O/P EST MOD 30 MIN: CPT

## 2023-01-24 PROCEDURE — 93000 ELECTROCARDIOGRAM COMPLETE: CPT

## 2023-01-24 PROCEDURE — 36415 COLL VENOUS BLD VENIPUNCTURE: CPT

## 2023-01-24 NOTE — ASSESSMENT
[FreeTextEntry1] : An EKG was performed to evaluate for arrhythmia and ischemia.\par \par Palpitations-- will order event recorder. I suggested sleep evaluation and referral to Dr Chun\par \par I encouraged continued risk factor reduction and gradual increase in aerobic activity as tolerated\par \par  34  minutes were spent discussing cardiac risk excluding procedure time

## 2023-01-24 NOTE — HISTORY OF PRESENT ILLNESS
[FreeTextEntry1] : 52 year female who comes for Cardiology followup of palpitations. It comes on when sitting in bed. She notes having a hiatal hernia and is anticipating needing surgery. She has had snoring at times and more lately with congestion. She has not been tested for SANTA. She notes having chronic disk problems with her neck

## 2023-01-25 LAB
ALBUMIN SERPL ELPH-MCNC: 4.4 G/DL
ALP BLD-CCNC: 63 U/L
ALT SERPL-CCNC: 23 U/L
ANION GAP SERPL CALC-SCNC: 9 MMOL/L
AST SERPL-CCNC: 19 U/L
BASOPHILS # BLD AUTO: 0.04 K/UL
BASOPHILS NFR BLD AUTO: 0.7 %
BILIRUB SERPL-MCNC: 0.2 MG/DL
BUN SERPL-MCNC: 10 MG/DL
CALCIUM SERPL-MCNC: 9.7 MG/DL
CHLORIDE SERPL-SCNC: 104 MMOL/L
CO2 SERPL-SCNC: 26 MMOL/L
CREAT SERPL-MCNC: 0.65 MG/DL
EGFR: 106 ML/MIN/1.73M2
EOSINOPHIL # BLD AUTO: 0.11 K/UL
EOSINOPHIL NFR BLD AUTO: 2.1 %
GLUCOSE SERPL-MCNC: 92 MG/DL
HCT VFR BLD CALC: 39.5 %
HGB BLD-MCNC: 12.6 G/DL
IMM GRANULOCYTES NFR BLD AUTO: 0.2 %
LYMPHOCYTES # BLD AUTO: 1.34 K/UL
LYMPHOCYTES NFR BLD AUTO: 25 %
MAGNESIUM SERPL-MCNC: 2 MG/DL
MAN DIFF?: NORMAL
MCHC RBC-ENTMCNC: 29.4 PG
MCHC RBC-ENTMCNC: 31.9 GM/DL
MCV RBC AUTO: 92.3 FL
MONOCYTES # BLD AUTO: 0.44 K/UL
MONOCYTES NFR BLD AUTO: 8.2 %
NEUTROPHILS # BLD AUTO: 3.42 K/UL
NEUTROPHILS NFR BLD AUTO: 63.8 %
PLATELET # BLD AUTO: 335 K/UL
POTASSIUM SERPL-SCNC: 4.5 MMOL/L
PROT SERPL-MCNC: 7.1 G/DL
RBC # BLD: 4.28 M/UL
RBC # FLD: 12.9 %
SODIUM SERPL-SCNC: 139 MMOL/L
TSH SERPL-ACNC: 1.62 UIU/ML
WBC # FLD AUTO: 5.36 K/UL

## 2023-01-26 ENCOUNTER — APPOINTMENT (OUTPATIENT)
Dept: INTERNAL MEDICINE | Facility: CLINIC | Age: 53
End: 2023-01-26

## 2023-01-31 ENCOUNTER — APPOINTMENT (OUTPATIENT)
Dept: ENDOCRINOLOGY | Facility: CLINIC | Age: 53
End: 2023-01-31
Payer: MEDICARE

## 2023-01-31 DIAGNOSIS — E66.01 MORBID (SEVERE) OBESITY DUE TO EXCESS CALORIES: ICD-10-CM

## 2023-01-31 PROCEDURE — 99214 OFFICE O/P EST MOD 30 MIN: CPT | Mod: 95

## 2023-02-24 ENCOUNTER — APPOINTMENT (OUTPATIENT)
Dept: INTERNAL MEDICINE | Facility: CLINIC | Age: 53
End: 2023-02-24
Payer: MEDICARE

## 2023-02-24 VITALS
TEMPERATURE: 96.4 F | WEIGHT: 181 LBS | HEIGHT: 64 IN | BODY MASS INDEX: 30.9 KG/M2 | DIASTOLIC BLOOD PRESSURE: 77 MMHG | HEART RATE: 89 BPM | OXYGEN SATURATION: 98 % | SYSTOLIC BLOOD PRESSURE: 113 MMHG

## 2023-02-24 DIAGNOSIS — R00.2 PALPITATIONS: ICD-10-CM

## 2023-02-24 DIAGNOSIS — E03.9 HYPOTHYROIDISM, UNSPECIFIED: ICD-10-CM

## 2023-02-24 DIAGNOSIS — E04.1 NONTOXIC SINGLE THYROID NODULE: ICD-10-CM

## 2023-02-24 DIAGNOSIS — D21.9 BENIGN NEOPLASM OF CONNECTIVE AND OTHER SOFT TISSUE, UNSPECIFIED: ICD-10-CM

## 2023-02-24 PROCEDURE — 36415 COLL VENOUS BLD VENIPUNCTURE: CPT

## 2023-02-24 PROCEDURE — 90662 IIV NO PRSV INCREASED AG IM: CPT

## 2023-02-24 PROCEDURE — 99204 OFFICE O/P NEW MOD 45 MIN: CPT | Mod: 25

## 2023-02-24 PROCEDURE — G0008: CPT

## 2023-02-27 LAB
25(OH)D3 SERPL-MCNC: 123 NG/ML
CHOLEST SERPL-MCNC: 186 MG/DL
ESTIMATED AVERAGE GLUCOSE: 108 MG/DL
ESTRADIOL SERPL-MCNC: <5 PG/ML
FERRITIN SERPL-MCNC: 77 NG/ML
FOLATE SERPL-MCNC: 16.9 NG/ML
FSH SERPL-MCNC: 47 IU/L
HBA1C MFR BLD HPLC: 5.4 %
HDLC SERPL-MCNC: 51 MG/DL
IRON SATN MFR SERPL: 21 %
IRON SERPL-MCNC: 85 UG/DL
LDLC SERPL CALC-MCNC: 101 MG/DL
LH SERPL-ACNC: 30.9 IU/L
NONHDLC SERPL-MCNC: 135 MG/DL
TIBC SERPL-MCNC: 411 UG/DL
TRIGL SERPL-MCNC: 171 MG/DL
UIBC SERPL-MCNC: 326 UG/DL
VIT B12 SERPL-MCNC: 946 PG/ML
VIT B12 SERPL-MCNC: 950 PG/ML

## 2023-02-27 RX ORDER — CHOLECALCIFEROL (VITAMIN D3) 1250 MCG
1.25 MG CAPSULE ORAL
Qty: 4 | Refills: 0 | Status: DISCONTINUED | COMMUNITY
Start: 2022-01-01 | End: 2023-02-27

## 2023-02-27 RX ORDER — FENOFIBRATE 145 MG/1
145 TABLET, COATED ORAL DAILY
Qty: 90 | Refills: 1 | Status: DISCONTINUED | COMMUNITY
Start: 2021-12-16 | End: 2023-02-27

## 2023-03-08 ENCOUNTER — APPOINTMENT (OUTPATIENT)
Dept: OBGYN | Facility: CLINIC | Age: 53
End: 2023-03-08
Payer: MEDICARE

## 2023-03-08 VITALS
BODY MASS INDEX: 31.41 KG/M2 | HEART RATE: 106 BPM | WEIGHT: 183 LBS | OXYGEN SATURATION: 97 % | DIASTOLIC BLOOD PRESSURE: 86 MMHG | SYSTOLIC BLOOD PRESSURE: 130 MMHG

## 2023-03-08 DIAGNOSIS — Z01.419 ENCOUNTER FOR GYNECOLOGICAL EXAMINATION (GENERAL) (ROUTINE) W/OUT ABNORMAL FINDINGS: ICD-10-CM

## 2023-03-08 PROCEDURE — G0101: CPT

## 2023-03-08 NOTE — HISTORY OF PRESENT ILLNESS
[Patient reported PAP Smear was normal] : Patient reported PAP Smear was normal [Y] : Positive pregnancy history [Menarche Age: ____] : age at menarche was [unfilled] [Currently In Menopause] : currently in menopause [Post-Menopause, No Sxs] : post-menopausal, currently without symptoms [Previously active] : previously active [Men] : men [No] : No [PapSmeardate] : 2018 [TextBox_31] : as per patient [PGxTotal] : 1 [[facility]] : 0 [PGHxABSpont] : 1 [FreeTextEntry1] : hysterectomy

## 2023-03-08 NOTE — PHYSICAL EXAM
[Chaperone Present] : A chaperone was present in the examining room during all aspects of the physical examination [Appropriately responsive] : appropriately responsive [Alert] : alert [No Acute Distress] : no acute distress [No Lymphadenopathy] : no lymphadenopathy [Regular Rate Rhythm] : regular rate rhythm [No Murmurs] : no murmurs [Clear to Auscultation B/L] : clear to auscultation bilaterally [Soft] : soft [Non-tender] : non-tender [Non-distended] : non-distended [No HSM] : No HSM [No Lesions] : no lesions [Oriented x3] : oriented x3 [FreeTextEntry7] : large umbilcal hernia, vertical scar [Examination Of The Breasts] : a normal appearance [No Masses] : no breast masses were palpable [Labia Majora] : normal [Labia Minora] : normal [Normal] : normal [Cystocele] : a cystocele [Rectocele] : a rectocele [Absent] : absent [Uterine Adnexae] : non-palpable

## 2023-03-09 LAB — HPV HIGH+LOW RISK DNA PNL CVX: NOT DETECTED

## 2023-03-12 ENCOUNTER — NON-APPOINTMENT (OUTPATIENT)
Age: 53
End: 2023-03-12

## 2023-03-13 LAB — CYTOLOGY CVX/VAG DOC THIN PREP: NORMAL

## 2023-03-30 ENCOUNTER — NON-APPOINTMENT (OUTPATIENT)
Age: 53
End: 2023-03-30

## 2023-03-30 ENCOUNTER — APPOINTMENT (OUTPATIENT)
Dept: OPHTHALMOLOGY | Facility: CLINIC | Age: 53
End: 2023-03-30
Payer: MEDICARE

## 2023-03-30 PROCEDURE — 92004 COMPRE OPH EXAM NEW PT 1/>: CPT

## 2023-03-30 PROCEDURE — 92134 CPTRZ OPH DX IMG PST SGM RTA: CPT

## 2023-05-05 NOTE — PLAN
n/a [FreeTextEntry1] : Sowmya Appiah 51yo with PMH of SIS/BS who presents for an annual exam.\par \par - Normal breast and pelvic exam\par - Pap collected, hx of HPV\par - MMG UTD\par - Reviewed cancer screening guidelines\par - Discussed HRT and would not recommend given risks and complaints only of fatigue and hair loss; more likely stress related than hormonal related; pt agrees; regardless she will try black cohosh and soy in diet\par \par RTO in 1 year or PRN.\par \par Gisell Coffman MD

## 2023-05-08 ENCOUNTER — APPOINTMENT (OUTPATIENT)
Dept: HEART AND VASCULAR | Facility: CLINIC | Age: 53
End: 2023-05-08
Payer: MEDICARE

## 2023-05-08 ENCOUNTER — NON-APPOINTMENT (OUTPATIENT)
Age: 53
End: 2023-05-08

## 2023-05-08 VITALS
TEMPERATURE: 98.6 F | HEIGHT: 64 IN | SYSTOLIC BLOOD PRESSURE: 110 MMHG | WEIGHT: 183.38 LBS | DIASTOLIC BLOOD PRESSURE: 74 MMHG | BODY MASS INDEX: 31.31 KG/M2 | HEART RATE: 91 BPM | OXYGEN SATURATION: 96 %

## 2023-05-08 PROCEDURE — 93000 ELECTROCARDIOGRAM COMPLETE: CPT

## 2023-05-08 PROCEDURE — 99213 OFFICE O/P EST LOW 20 MIN: CPT

## 2023-05-08 NOTE — HISTORY OF PRESENT ILLNESS
[FreeTextEntry1] : 52 year female with CAD who comes for Cardiology evaluation of fatigue. She notes concern about sleep apnea. She believes she had a normal sleep study years ago. She wore an event recorder without arrhythmia. She wonders if her palpitations are related to menopause. She denies having any chest pain, SOB, BEDOYA, dizziness, palpitations, orthopnea, PND or syncope

## 2023-05-08 NOTE — ASSESSMENT
[FreeTextEntry1] : An EKG was performed to evaluate for arrhythmia and ischemia.\par \par I asked her to return for an Echocardiogram and Stress-Echocardiogram\par \par I encouraged continued risk factor reduction and gradual increase in aerobic activity as tolerated\par \par 20   minutes were spent discussing cardiac risk excluding procedure time

## 2023-05-18 ENCOUNTER — APPOINTMENT (OUTPATIENT)
Dept: PULMONOLOGY | Facility: CLINIC | Age: 53
End: 2023-05-18
Payer: MEDICARE

## 2023-05-18 VITALS
HEIGHT: 64 IN | DIASTOLIC BLOOD PRESSURE: 80 MMHG | BODY MASS INDEX: 31.24 KG/M2 | TEMPERATURE: 97.8 F | HEART RATE: 108 BPM | SYSTOLIC BLOOD PRESSURE: 120 MMHG | WEIGHT: 183 LBS | OXYGEN SATURATION: 98 %

## 2023-05-18 DIAGNOSIS — R53.83 OTHER FATIGUE: ICD-10-CM

## 2023-05-18 PROCEDURE — 99204 OFFICE O/P NEW MOD 45 MIN: CPT

## 2023-05-26 ENCOUNTER — APPOINTMENT (OUTPATIENT)
Dept: HEART AND VASCULAR | Facility: CLINIC | Age: 53
End: 2023-05-26

## 2023-05-31 ENCOUNTER — APPOINTMENT (OUTPATIENT)
Dept: HEART AND VASCULAR | Facility: CLINIC | Age: 53
End: 2023-05-31
Payer: MEDICARE

## 2023-05-31 VITALS
WEIGHT: 182.98 LBS | SYSTOLIC BLOOD PRESSURE: 120 MMHG | HEART RATE: 80 BPM | DIASTOLIC BLOOD PRESSURE: 78 MMHG | HEIGHT: 72 IN | BODY MASS INDEX: 24.78 KG/M2 | OXYGEN SATURATION: 99 %

## 2023-05-31 DIAGNOSIS — R55 SYNCOPE AND COLLAPSE: ICD-10-CM

## 2023-05-31 DIAGNOSIS — I25.10 ATHEROSCLEROTIC HEART DISEASE OF NATIVE CORONARY ARTERY W/OUT ANGINA PECTORIS: ICD-10-CM

## 2023-05-31 PROCEDURE — 93306 TTE W/DOPPLER COMPLETE: CPT

## 2023-06-12 ENCOUNTER — RX RENEWAL (OUTPATIENT)
Age: 53
End: 2023-06-12

## 2023-06-30 ENCOUNTER — APPOINTMENT (OUTPATIENT)
Dept: HEART AND VASCULAR | Facility: CLINIC | Age: 53
End: 2023-06-30
Payer: MEDICARE

## 2023-06-30 VITALS
WEIGHT: 181 LBS | BODY MASS INDEX: 24.52 KG/M2 | OXYGEN SATURATION: 98 % | DIASTOLIC BLOOD PRESSURE: 80 MMHG | TEMPERATURE: 98.5 F | HEART RATE: 109 BPM | HEIGHT: 72 IN | SYSTOLIC BLOOD PRESSURE: 125 MMHG

## 2023-06-30 DIAGNOSIS — I25.10 ATHEROSCLEROTIC HEART DISEASE OF NATIVE CORONARY ARTERY W/OUT ANGINA PECTORIS: ICD-10-CM

## 2023-06-30 PROCEDURE — ZZZZZ: CPT

## 2023-06-30 PROCEDURE — 93351 STRESS TTE COMPLETE: CPT

## 2023-06-30 NOTE — ASSESSMENT
[FreeTextEntry1] : At the time of the patient's visit a Stress Echocardiogram was performed to evaluate for exercise induced arrhythmia and ischemia. At the time of the visit the results were reviewed with patient\par \par I encouraged continued risk factor reduction and gradual increase in aerobic activity as tolerated\par \par  18  minutes were spent discussing cardiac risk excluding procedure time

## 2023-07-13 ENCOUNTER — APPOINTMENT (OUTPATIENT)
Dept: SLEEP CENTER | Facility: HOME HEALTH | Age: 53
End: 2023-07-13
Payer: MEDICARE

## 2023-07-13 ENCOUNTER — OUTPATIENT (OUTPATIENT)
Dept: OUTPATIENT SERVICES | Facility: HOSPITAL | Age: 53
LOS: 1 days | End: 2023-07-13
Payer: MEDICARE

## 2023-07-13 DIAGNOSIS — Z98.890 OTHER SPECIFIED POSTPROCEDURAL STATES: Chronic | ICD-10-CM

## 2023-07-13 DIAGNOSIS — G47.33 OBSTRUCTIVE SLEEP APNEA (ADULT) (PEDIATRIC): ICD-10-CM

## 2023-07-13 DIAGNOSIS — Z98.61 CORONARY ANGIOPLASTY STATUS: Chronic | ICD-10-CM

## 2023-07-13 DIAGNOSIS — Z86.39 PERSONAL HISTORY OF OTHER ENDOCRINE, NUTRITIONAL AND METABOLIC DISEASE: Chronic | ICD-10-CM

## 2023-07-13 DIAGNOSIS — Z98.89 OTHER SPECIFIED POSTPROCEDURAL STATES: Chronic | ICD-10-CM

## 2023-07-13 DIAGNOSIS — S01.20XA UNSPECIFIED OPEN WOUND OF NOSE, INITIAL ENCOUNTER: Chronic | ICD-10-CM

## 2023-07-13 PROCEDURE — G0400: CPT | Mod: 26

## 2023-07-13 PROCEDURE — 95800 SLP STDY UNATTENDED: CPT

## 2023-08-01 NOTE — HISTORY OF PRESENT ILLNESS
[TextBox_4] : 05/18/2023: Asked to evaluate patient by Dr Falk for eval for SANTA. Chronic Lyme, always tired, accustomed to sleep being disrupted from pain but generally felt sleep was ok till 3-4 mos ago when her sleep became unrefreshing, she is exhausted all the time, can barely get out of bed. Past 3 years very stressful due to mother's health. Has lost 50 lbs. No change in her meds. Doesn't drink or smoke. Going through menopause. Seasonal affective disorder, takes antidepressants seasonally and did have trouble tolerating Zoloft this year which was unusual and this did disturb her sleep this winter. Snores, dry throat, doesn't wake gasping or choking, no AM headache.  [ESS] : 15

## 2023-08-01 NOTE — ASSESSMENT
[FreeTextEntry1] : Impression: Fatigue  Plan: HST to evaluate for SANTA. If negative, PSG. -- HST [facility] 7/2023: up to mild (AHI 3.7/10/4) SANTA w <1% below 88% LM on VM - I would favor PSG but CPAP would also be an option.

## 2023-08-01 NOTE — CONSULT LETTER
[Dear  ___] : Dear  [unfilled], [( Thank you for referring [unfilled] for consultation for _____ )] : Thank you for referring [unfilled] for consultation for [unfilled] [Please see my note below.] : Please see my note below. [Consult Closing:] : Thank you very much for allowing me to participate in the care of this patient.  If you have any questions, please do not hesitate to contact me. [Sincerely,] : Sincerely, [FreeTextEntry2] : Alberto Falk MD\par [address]\[place] [address] [FreeTextEntry3] : Inga Davila MD, FCCP\par

## 2023-08-23 ENCOUNTER — APPOINTMENT (OUTPATIENT)
Dept: DERMATOLOGY | Facility: CLINIC | Age: 53
End: 2023-08-23
Payer: MEDICARE

## 2023-08-23 DIAGNOSIS — H01.9 UNSPECIFIED INFLAMMATION OF EYELID: ICD-10-CM

## 2023-08-23 DIAGNOSIS — D22.9 MELANOCYTIC NEVI, UNSPECIFIED: ICD-10-CM

## 2023-08-23 DIAGNOSIS — Z12.83 ENCOUNTER FOR SCREENING FOR MALIGNANT NEOPLASM OF SKIN: ICD-10-CM

## 2023-08-23 PROCEDURE — 99214 OFFICE O/P EST MOD 30 MIN: CPT

## 2023-08-23 NOTE — PHYSICAL EXAM
[Alert] : alert [Oriented x 3] : ~L oriented x 3 [Well Nourished] : well nourished [Conjunctiva Non-injected] : conjunctiva non-injected [No Visual Lymphadenopathy] : no visual  lymphadenopathy [No Clubbing] : no clubbing [No Edema] : no edema [No Bromhidrosis] : no bromhidrosis [No Chromhidrosis] : no chromhidrosis [Full Body Skin Exam Performed] : performed [FreeTextEntry3] : white skin scattered brown macules and papules no visible dermatitis eyelids

## 2023-08-23 NOTE — HISTORY OF PRESENT ILLNESS
[FreeTextEntry1] : eczema, moles [de-identified] : last cbe in 2022 no hx skin cancer eczema on eyelids, not present now, requesting rx

## 2023-09-27 ENCOUNTER — APPOINTMENT (OUTPATIENT)
Dept: HEART AND VASCULAR | Facility: CLINIC | Age: 53
End: 2023-09-27
Payer: MEDICARE

## 2023-09-27 PROCEDURE — G0008: CPT

## 2023-09-27 PROCEDURE — 90686 IIV4 VACC NO PRSV 0.5 ML IM: CPT

## 2023-10-13 ENCOUNTER — APPOINTMENT (OUTPATIENT)
Dept: HEART AND VASCULAR | Facility: CLINIC | Age: 53
End: 2023-10-13
Payer: MEDICARE

## 2023-10-13 VITALS
DIASTOLIC BLOOD PRESSURE: 70 MMHG | HEIGHT: 64 IN | SYSTOLIC BLOOD PRESSURE: 106 MMHG | HEART RATE: 97 BPM | WEIGHT: 184 LBS | OXYGEN SATURATION: 98 % | BODY MASS INDEX: 31.41 KG/M2 | TEMPERATURE: 98.9 F

## 2023-10-13 DIAGNOSIS — L70.9 ACNE, UNSPECIFIED: ICD-10-CM

## 2023-10-13 DIAGNOSIS — G89.29 OTHER CHRONIC PAIN: ICD-10-CM

## 2023-10-13 PROCEDURE — 99213 OFFICE O/P EST LOW 20 MIN: CPT

## 2023-10-16 PROBLEM — G89.29 CHRONIC PAIN: Status: ACTIVE | Noted: 2023-10-16

## 2023-11-09 ENCOUNTER — RX RENEWAL (OUTPATIENT)
Age: 53
End: 2023-11-09

## 2023-11-15 ENCOUNTER — APPOINTMENT (OUTPATIENT)
Dept: INTERNAL MEDICINE | Facility: CLINIC | Age: 53
End: 2023-11-15

## 2023-12-04 ENCOUNTER — NON-APPOINTMENT (OUTPATIENT)
Age: 53
End: 2023-12-04

## 2023-12-11 ENCOUNTER — RX RENEWAL (OUTPATIENT)
Age: 53
End: 2023-12-11

## 2024-01-23 NOTE — ADDENDUM
[FreeTextEntry1] : Recent laboratory results as below; discussed with Ms. Gomez. HbA1c 5.4%. Lipid panel above goal; we will adjust atorvastatin from 20 to 40 mg daily. Vitamin D above goal and recommended holding supplementation for three months, then restarting at a lower dose. Follicle stimulating hormone, luteinizing hormone, and estradiol within the menopausal range. Vitamin B12 within the normal range. 2/27/23  Ms. Gomez had palpitations with a selective serotonin reuptake inhibitor prescribed for seasonal affective disorder. I advised she discuss alternative therapy with her primary care provider. She was advised to schedule an appointment in our office as soon as possible. 1/23/24

## 2024-01-23 NOTE — HISTORY OF PRESENT ILLNESS
[Home] : at home, [unfilled] , at the time of the visit. [Medical Office: ([facility])___] : at the medical office located in  [Verbal consent obtained from patient] : the patient, [unfilled] [FreeTextEntry1] : Ms. 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 Mal Lyons, elevated body mass index presenting for follow-up of her endocrine issues. I saw her for an initial visit in March 2019 and last in September 2022 as a telehealth visit.\par \par Type 2 diabetes mellitus. HbA1c 7.2% in June 2022, 5.4% in December 2021, 6.1% in June 2021, 6.3% in February 2021, HbA1c 6.4% in June 2020, 7.0% in February 2020, 6.5% in December 2019. Coronary artery disease. \par She was diagnosed with diabetes in December 2019 by HbA1c. No hospitalizations for hypo- or hyperglycemia.\par We restarted metformin  mg daily in December 2019. She did not tolerate Victoza in the past for weight loss due to issues with self-injection. In December 2019 we started Trulicity but she did not tolerate. In February 2020, we adjusted metformin up to 2000 mg daily and started Jardiance 10 mg daily; she stopped Jardiance due to concern for side effects. She had been off metformin since the fall due to concern for the recall. \par In January 2021 we restarted metformin ER up to 2000 mg daily. \par In April 2022 we discontinued metformin ER 2000 mg daily due to side effects and started Rybelsus 3 mg daily up to 14 mg daily.\par She is currently taking Rybelsus 14 mg daily.\par She is not on a blood pressure regimen\par She is not on a statin for cholesterol\par Nephropathy screening: Due\par Last ophthalmology appointment: Over a year\par Last dental appointment: Over a year\par \par Hypothyroidism.\par She was diagnosed with hypothyroidism around 2012.\par She has been on Synthroid (brand-name) 50 mcg daily since that time.\par She is taking omeprazole first in the morning, on an empty stomach, then is taking levothyroxine about an hour later, with plain water, and waiting at least 30 minutes before eating. She is not taking calcium/iron/multivitamin. She has been on omeprazole for about five years.\par No history of radiation exposure.\par Her mother has a history of hypothyroidism and nontoxic multinodular goiter, and maternal grandmother with goiter.\par \par Interim History \par She is currently taking Rybelsus 14 mg daily.\par She has had a recent upper respiratory infection. \par Her mother has been ill and has required care. She has had a lot of stress related to her care. \par She has seen multiple providers; notes reviewed. Last laboratory results reviewed.\par Weight is down 38 pounds from her highest weight of 224 pounds. She has had palpitations and is undergoing further evaluation. No chest pain, shortness of breath, polyuria/polydipsia, lower extremity numbness/tingling. \par Medical and surgical history, medications, allergies, social and family history reviewed and updated as needed.

## 2024-01-23 NOTE — ASSESSMENT
[FreeTextEntry1] : Type 2 diabetes mellitus/elevated body mass index. HbA1c 7.2% in June 2022. Coronary artery disease. I congratulated her on her glycemic control close to goal and overall weight loss. We have discussed the cardiovascular and microvascular complications of diabetes. We discussed the importance of diet and exercise and lifestyle modification for glycemic control and weight loss. She has declined follow-up with nutrition. We discussed pharmacologic options for glycemic control and weight loss. She is tolerating her current regimen and we will continue. She has considered surgical options for weight loss but does not want to pursue at this time. \par Monitor HbA1c, lipid panel, urine microalbumin\par Continue Rybelsus 14 mg daily\par She is not on a blood pressure regimen; last blood pressure around goal\par She is not on a statin for cholesterol; she will follow with cardiology\par Nephropathy screening: Due\par Last ophthalmology appointment: Over a year; advised appointment when appropriate\par Last dental appointment: Over a year; advised appointment when appropriate\par \par Hypothyroidism. She has been biochemically euthyroid on her current regimen of levothyroxine. We have reviewed proper use and compliance with levothyroxine.\par Continue Synthroid 50 mcg daily\par \par Return to see me in 4-6 months. Patient advised to call earlier with significant hypo- or hyperglycemia.

## 2024-02-28 ENCOUNTER — RX RENEWAL (OUTPATIENT)
Age: 54
End: 2024-02-28

## 2024-02-28 RX ORDER — ATORVASTATIN CALCIUM 40 MG/1
40 TABLET, FILM COATED ORAL
Qty: 90 | Refills: 1 | Status: ACTIVE | COMMUNITY
Start: 2024-02-28 | End: 1900-01-01

## 2024-03-12 ENCOUNTER — RX RENEWAL (OUTPATIENT)
Age: 54
End: 2024-03-12

## 2024-03-12 RX ORDER — TRETINOIN 0.5 MG/G
0.05 CREAM TOPICAL
Qty: 20 | Refills: 3 | Status: ACTIVE | COMMUNITY
Start: 2023-10-13 | End: 1900-01-01

## 2024-03-26 ENCOUNTER — RX RENEWAL (OUTPATIENT)
Age: 54
End: 2024-03-26

## 2024-04-16 ENCOUNTER — APPOINTMENT (OUTPATIENT)
Dept: INTERNAL MEDICINE | Facility: CLINIC | Age: 54
End: 2024-04-16
Payer: MEDICARE

## 2024-04-16 VITALS
WEIGHT: 208.13 LBS | TEMPERATURE: 97 F | SYSTOLIC BLOOD PRESSURE: 117 MMHG | HEIGHT: 64 IN | BODY MASS INDEX: 35.53 KG/M2 | HEART RATE: 112 BPM | DIASTOLIC BLOOD PRESSURE: 75 MMHG | OXYGEN SATURATION: 96 %

## 2024-04-16 DIAGNOSIS — M48.061 SPINAL STENOSIS, LUMBAR REGION WITHOUT NEUROGENIC CLAUDICATION: ICD-10-CM

## 2024-04-16 DIAGNOSIS — L65.9 NONSCARRING HAIR LOSS, UNSPECIFIED: ICD-10-CM

## 2024-04-16 DIAGNOSIS — N95.1 MENOPAUSAL AND FEMALE CLIMACTERIC STATES: ICD-10-CM

## 2024-04-16 PROCEDURE — 99203 OFFICE O/P NEW LOW 30 MIN: CPT

## 2024-04-16 PROCEDURE — 36415 COLL VENOUS BLD VENIPUNCTURE: CPT

## 2024-04-16 NOTE — ASSESSMENT
[FreeTextEntry1] :  #HCM -mammo UTD  -pap smear UTD  -colon cancer screening, following with GI  -vaccines UTd

## 2024-04-16 NOTE — HISTORY OF PRESENT ILLNESS
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 Mentions following with pain mgmt for chronic pain s/p lyme. She states has hx of lumbar spinal stenosis, is going to start PT soon but would like to see ortho specialist. Mentions increased hair loss, started using minoxidil serum.Hx of thyroid nodule, had US last year and stable nodules, follows with endo. States takes rybelsus and feels has interaction when takes sertraline for few months during time she has seasonal affective disorder.

## 2024-04-29 ENCOUNTER — APPOINTMENT (OUTPATIENT)
Dept: INTERNAL MEDICINE | Facility: CLINIC | Age: 54
End: 2024-04-29

## 2024-04-29 DIAGNOSIS — N39.0 URINARY TRACT INFECTION, SITE NOT SPECIFIED: ICD-10-CM

## 2024-05-01 ENCOUNTER — APPOINTMENT (OUTPATIENT)
Dept: ENDOCRINOLOGY | Facility: CLINIC | Age: 54
End: 2024-05-01

## 2024-05-03 ENCOUNTER — APPOINTMENT (OUTPATIENT)
Dept: ORTHOPEDIC SURGERY | Facility: CLINIC | Age: 54
End: 2024-05-03

## 2024-05-13 ENCOUNTER — RX RENEWAL (OUTPATIENT)
Age: 54
End: 2024-05-13

## 2024-05-13 ENCOUNTER — APPOINTMENT (OUTPATIENT)
Dept: ORTHOPEDIC SURGERY | Facility: CLINIC | Age: 54
End: 2024-05-13
Payer: MEDICARE

## 2024-05-13 VITALS
TEMPERATURE: 97 F | OXYGEN SATURATION: 96 % | WEIGHT: 208 LBS | HEART RATE: 108 BPM | BODY MASS INDEX: 35.51 KG/M2 | DIASTOLIC BLOOD PRESSURE: 89 MMHG | SYSTOLIC BLOOD PRESSURE: 119 MMHG | HEIGHT: 64 IN

## 2024-05-13 DIAGNOSIS — M54.2 CERVICALGIA: ICD-10-CM

## 2024-05-13 DIAGNOSIS — M54.50 LOW BACK PAIN, UNSPECIFIED: ICD-10-CM

## 2024-05-13 PROCEDURE — 99205 OFFICE O/P NEW HI 60 MIN: CPT

## 2024-05-13 PROCEDURE — 72100 X-RAY EXAM L-S SPINE 2/3 VWS: CPT

## 2024-05-13 PROCEDURE — 72040 X-RAY EXAM NECK SPINE 2-3 VW: CPT

## 2024-05-14 ENCOUNTER — APPOINTMENT (OUTPATIENT)
Dept: ENDOCRINOLOGY | Facility: CLINIC | Age: 54
End: 2024-05-14
Payer: MEDICARE

## 2024-05-14 VITALS
SYSTOLIC BLOOD PRESSURE: 132 MMHG | HEART RATE: 98 BPM | WEIGHT: 204 LBS | DIASTOLIC BLOOD PRESSURE: 83 MMHG | BODY MASS INDEX: 35.02 KG/M2

## 2024-05-14 DIAGNOSIS — E11.69 TYPE 2 DIABETES MELLITUS WITH OTHER SPECIFIED COMPLICATION: ICD-10-CM

## 2024-05-14 DIAGNOSIS — E78.5 TYPE 2 DIABETES MELLITUS WITH OTHER SPECIFIED COMPLICATION: ICD-10-CM

## 2024-05-14 DIAGNOSIS — E66.9 OBESITY, UNSPECIFIED: ICD-10-CM

## 2024-05-14 DIAGNOSIS — E11.9 TYPE 2 DIABETES MELLITUS W/OUT COMPLICATIONS: ICD-10-CM

## 2024-05-14 LAB
GLUCOSE BLDC GLUCOMTR-MCNC: 97
HBA1C MFR BLD HPLC: 5.4

## 2024-05-14 PROCEDURE — 83036 HEMOGLOBIN GLYCOSYLATED A1C: CPT | Mod: QW

## 2024-05-14 PROCEDURE — 99214 OFFICE O/P EST MOD 30 MIN: CPT | Mod: 25

## 2024-05-14 PROCEDURE — 82962 GLUCOSE BLOOD TEST: CPT

## 2024-05-14 RX ORDER — MUPIROCIN 20 MG/G
2 OINTMENT TOPICAL
Qty: 1 | Refills: 0 | Status: DISCONTINUED | COMMUNITY
Start: 2022-08-01 | End: 2024-05-14

## 2024-05-14 RX ORDER — SERTRALINE 25 MG/1
25 TABLET, FILM COATED ORAL DAILY
Qty: 90 | Refills: 1 | Status: DISCONTINUED | COMMUNITY
Start: 2020-11-13 | End: 2024-05-14

## 2024-05-14 RX ORDER — HYDROCORTISONE 25 MG/G
2.5 CREAM TOPICAL
Qty: 1 | Refills: 0 | Status: DISCONTINUED | COMMUNITY
Start: 2023-08-23 | End: 2024-05-14

## 2024-05-14 RX ORDER — ORAL SEMAGLUTIDE 14 MG/1
14 TABLET ORAL
Qty: 90 | Refills: 2 | Status: ACTIVE | COMMUNITY
Start: 2022-04-07 | End: 1900-01-01

## 2024-05-14 RX ORDER — HYDROCORTISONE 25 MG/G
2.5 OINTMENT TOPICAL
Qty: 1 | Refills: 2 | Status: DISCONTINUED | COMMUNITY
Start: 2021-01-13 | End: 2024-05-14

## 2024-05-14 RX ORDER — TRIAMCINOLONE ACETONIDE 1 MG/G
0.1 CREAM TOPICAL 3 TIMES DAILY
Qty: 1 | Refills: 0 | Status: DISCONTINUED | COMMUNITY
Start: 2022-05-23 | End: 2024-05-14

## 2024-05-14 RX ORDER — TRIAMCINOLONE ACETONIDE 1 MG/G
0.1 OINTMENT TOPICAL
Qty: 1 | Refills: 1 | Status: DISCONTINUED | COMMUNITY
Start: 2021-01-13 | End: 2024-05-14

## 2024-05-14 RX ORDER — NITROFURANTOIN MACROCRYSTALS 100 MG/1
100 CAPSULE ORAL
Qty: 10 | Refills: 0 | Status: DISCONTINUED | COMMUNITY
Start: 2024-04-29 | End: 2024-05-14

## 2024-05-14 RX ORDER — TACROLIMUS 1 MG/G
0.1 OINTMENT TOPICAL
Qty: 1 | Refills: 2 | Status: DISCONTINUED | COMMUNITY
Start: 2021-07-13 | End: 2024-05-14

## 2024-05-15 NOTE — ASSESSMENT
[FreeTextEntry1] : 53-year-old female with chronic neck pain in the setting of degenerative disc disease as well as chronic low back pain lower extremity claudication in the setting of L4-5 degenerative spondylolisthesis

## 2024-05-15 NOTE — DISCUSSION/SUMMARY
[de-identified] : Either conversation with the patient regarding additional treatment options risk benefits and alternatives.  Given her failure of conservative management thus far recommended MRI of the cervical and lumbar spine to evaluate for any compressive radicular degenerative pathology that may correlate with her symptoms.  She describes a significant aversion to MRI given and has a sense that she will be able to lie still for the.  Required.  As an alternative we described discussed the relative utility risk benefit of a CT myelogram which she would like to obtain.  Once the images are available for work for review we will discuss appropriate neck steps as needed.  In the interim we also recommended consideration of an additional physical therapy protocol for symptomatic relief  I have spent greater than 60 minutes preparing to see the patient, collecting relevant history, performing a thorough history and physical examination, counseling the patient regarding my findings ordering the appropriate therapies and tests, communicating with other relevant healthcare professionals, documenting my encounter and coordinating care.

## 2024-05-15 NOTE — PHYSICAL EXAM
[UE/LE] : Sensory: Intact in bilateral upper & lower extremities [Normal DTR Reflexes] : DTR reflexes normal [Normal Proprioception] : sensation intact for proprioception [Normal] : No costovertebral angle tenderness and no spinal tenderness [de-identified] : AP lateral lumbar spine x-ray Ortho PACS 5/13/2024: No coronal abnormality.  Grade 1 anterolisthesis L4-5 with maintenance of disc base height.  The space generation L5-S1.  Lumbar lordosis maintained  AP lateral cervical spine x-ray 5/13/2024 Ortho PACS: Disc space degeneration at C5-6 and C6-7 with anterior aspect formation.  No evidence of listhesis.  Minimal spondylotic change in posterior elements

## 2024-05-15 NOTE — HISTORY OF PRESENT ILLNESS
[de-identified] : 53-year-old female presents for evaluation of chronic neck pain right hand numbness low back pain and lower extremity pain.  She has history of chronic Lyme fibromyalgia is also status post total hysterectomy for large fibroid that was previously undiagnosed.  Describes injury to the neck approximately 6 years ago that resulted in chronic pain diffusely in the midline near the cervical thoracic junction occasionally rating to the bilateral trapezius area.  Also has some occasional numbness in the right hand that is intermittent.  Her low back pain has bothered her for many years she describes an aching soreness particularly when climbing stairs or walking.  She was that her overall functional status took a severe decline during the COVID pandemic as she was relatively inactive.  Describes some heaviness in the legs worse on the right than the left when walking.  She is a patient of Dr. Villalobos and has undergone multiple treatment approaches including epidural steroid injection or medications and multiple rounds of physical therapy over the years without significant relief.

## 2024-05-22 LAB
CREAT SPEC-SCNC: 113 MG/DL
MICROALBUMIN 24H UR DL<=1MG/L-MCNC: <1.2 MG/DL
MICROALBUMIN/CREAT 24H UR-RTO: NORMAL MG/G

## 2024-05-22 NOTE — PHYSICAL EXAM
[Alert] : alert [Healthy Appearance] : healthy appearance [No Acute Distress] : no acute distress [Normal Sclera/Conjunctiva] : normal sclera/conjunctiva [Normal Hearing] : hearing was normal [No Respiratory Distress] : no respiratory distress [No Stigmata of Cushings Syndrome] : no stigmata of Cushings Syndrome [Normal Gait] : normal gait [Right foot was examined, including] : right foot ~C was examined, including visual inspection with sensory and pulse exams [Left foot was examined, including] : left foot ~C was examined, including visual inspection with sensory and pulse exams [Normal] : normal [2+] : 2+ in the dorsalis pedis [Normal Insight/Judgement] : insight and judgment were intact [Kyphosis] : no kyphosis present [Acanthosis Nigricans] : no acanthosis nigricans [Diminished Throughout Both Feet] : normal tactile sensation with monofilament testing throughout both feet [de-identified] : no moon facies, no supraclavicular fat pads

## 2024-05-22 NOTE — ASSESSMENT
[FreeTextEntry1] : Type 2 diabetes mellitus/elevated body mass index. Point-of-care HbA1c 5.4% and glucose 97 mg/dL today. Coronary artery disease. I congratulated her on her excellent glycemic control and overall weight loss. We have discussed the cardiovascular and microvascular complications of diabetes. We discussed the importance of diet and exercise and lifestyle modification for glycemic control and weight loss. She has declined follow-up with nutrition. We discussed pharmacologic options for glycemic control and weight loss. She is tolerating her current regimen and we will continue. She is unable to self-inject, precluding use of GLP-1 receptor agonist therapy with cardiovascular benefit. She has considered surgical options for weight loss but does not want to pursue at this time.  Continue Rybelsus 14 mg daily She is not on a blood pressure regimen; blood pressure around goal She is not on a statin for cholesterol; monitor lipid panel Nephropathy screening: Due Last ophthalmology appointment: Over a year; advised appointment Last dental appointment: Within six months  Hypothyroidism. She has been biochemically euthyroid on her current regimen of levothyroxine. We have reviewed proper use and compliance with levothyroxine. Continue Synthroid 50 mcg daily pending level  Return to see me in 6 months. Patient advised to call earlier with significant hypo- or hyperglycemia.

## 2024-05-22 NOTE — ADDENDUM
[FreeTextEntry1] : Recent urine microalbumin within range. I left a telephone message for MsKrish Patricia to discuss. 5/22/24

## 2024-05-22 NOTE — HISTORY OF PRESENT ILLNESS
[Home] : at home, [unfilled] , at the time of the visit. [Medical Office: ([facility])___] : at the medical office located in  [Verbal consent obtained from patient] : the patient, [unfilled] [FreeTextEntry1] : Ms. 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 Mal Lyons, elevated body mass index presenting for follow-up of her endocrine issues. I saw her for an initial visit in March 2019 and last in January 2023.  Type 2 diabetes mellitus. Point-of-care HbA1c 5.4% and glucose 97 mg/dL today; HbA1c 5.4% in January 2023. Coronary artery disease.  She was diagnosed with diabetes in December 2019 by HbA1c. No hospitalizations for hypo- or hyperglycemia. We restarted metformin  mg daily in December 2019. She did not tolerate Victoza in the past for weight loss due to issues with self-injection. In December 2019 we started Trulicity but she did not tolerate. In February 2020, we adjusted metformin up to 2000 mg daily and started Jardiance 10 mg daily; she stopped Jardiance due to concern for side effects. She had been off metformin since the fall due to concern for the recall.  In January 2021 we restarted metformin ER up to 2000 mg daily.  In April 2022 we discontinued metformin ER 2000 mg daily due to side effects and started Rybelsus 3 mg daily up to 14 mg daily. She is currently taking Rybelsus 14 mg daily. She is not on a blood pressure regimen She is not on a statin for cholesterol Nephropathy screening: Due Last ophthalmology appointment: Over a year Last dental appointment: Within six months  Hypothyroidism. She was diagnosed with hypothyroidism around 2012. She has been on Synthroid (brand-name) 50 mcg daily since that time. She is taking omeprazole first in the morning, on an empty stomach, then is taking levothyroxine about an hour later, with plain water, and waiting at least 30 minutes before eating. She is not taking calcium/iron/multivitamin. She has been on omeprazole for about five years. No history of radiation exposure. Her mother has a history of hypothyroidism and nontoxic multinodular goiter, and maternal grandmother with goiter.  Interim History  She is currently taking Rybelsus 14 mg daily for diabetes and Synthroid 50 mcg daily for hypothyroidism. She has had a recent urinary tract infection. She is scheduled for upcoming blood tests.  She has seen multiple providers; notes reviewed.  Weight is up from 183 pounds last visit after starting antidepressant therapy; weight is overall down from her highest weight of 224 pounds. She has had significant back pain and is following with multiple providers. She has had lower extremity numbness/tingling she attributes to sciatica. No chest pain or shortness of breath.  Medical and surgical history, medications, allergies, social and family history reviewed and updated as needed.

## 2024-05-28 LAB
25(OH)D3 SERPL-MCNC: 54 NG/ML
ALBUMIN SERPL ELPH-MCNC: 4.3 G/DL
ALP BLD-CCNC: 49 U/L
ALT SERPL-CCNC: 22 U/L
ANION GAP SERPL CALC-SCNC: 13 MMOL/L
AST SERPL-CCNC: 16 U/L
BASOPHILS # BLD AUTO: 0.05 K/UL
BASOPHILS NFR BLD AUTO: 0.4 %
BILIRUB SERPL-MCNC: 0.4 MG/DL
BUN SERPL-MCNC: 13 MG/DL
CALCIUM SERPL-MCNC: 9.9 MG/DL
CHLORIDE SERPL-SCNC: 101 MMOL/L
CHOLEST SERPL-MCNC: 190 MG/DL
CO2 SERPL-SCNC: 23 MMOL/L
CREAT SERPL-MCNC: 0.84 MG/DL
EGFR: 83 ML/MIN/1.73M2
EOSINOPHIL # BLD AUTO: 0.07 K/UL
EOSINOPHIL NFR BLD AUTO: 0.6 %
ESTIMATED AVERAGE GLUCOSE: 114 MG/DL
ESTRADIOL SERPL-MCNC: 5 PG/ML
FERRITIN SERPL-MCNC: 80 NG/ML
FOLATE SERPL-MCNC: 17.8 NG/ML
FSH SERPL-MCNC: 57.3 IU/L
GLUCOSE SERPL-MCNC: 98 MG/DL
HBA1C MFR BLD HPLC: 5.6 %
HCT VFR BLD CALC: 41.9 %
HDLC SERPL-MCNC: 46 MG/DL
HGB BLD-MCNC: 13.3 G/DL
IMM GRANULOCYTES NFR BLD AUTO: 0.4 %
IRON SATN MFR SERPL: 21 %
IRON SERPL-MCNC: 81 UG/DL
LDLC SERPL CALC-MCNC: 114 MG/DL
LH SERPL-ACNC: 25.6 IU/L
LYMPHOCYTES # BLD AUTO: 2.77 K/UL
LYMPHOCYTES NFR BLD AUTO: 24.7 %
MAN DIFF?: NORMAL
MCHC RBC-ENTMCNC: 29.7 PG
MCHC RBC-ENTMCNC: 31.7 GM/DL
MCV RBC AUTO: 93.5 FL
MONOCYTES # BLD AUTO: 0.72 K/UL
MONOCYTES NFR BLD AUTO: 6.4 %
NEUTROPHILS # BLD AUTO: 7.56 K/UL
NEUTROPHILS NFR BLD AUTO: 67.5 %
NONHDLC SERPL-MCNC: 143 MG/DL
PLATELET # BLD AUTO: 452 K/UL
POTASSIUM SERPL-SCNC: 4.7 MMOL/L
PROT SERPL-MCNC: 6.9 G/DL
RBC # BLD: 4.48 M/UL
RBC # FLD: 13.4 %
SODIUM SERPL-SCNC: 137 MMOL/L
TIBC SERPL-MCNC: 383 UG/DL
TRIGL SERPL-MCNC: 165 MG/DL
TSH SERPL-ACNC: 2.12 UIU/ML
UIBC SERPL-MCNC: 302 UG/DL
VIT B12 SERPL-MCNC: 1295 PG/ML
WBC # FLD AUTO: 11.21 K/UL

## 2024-05-29 ENCOUNTER — APPOINTMENT (OUTPATIENT)
Dept: ORTHOPEDIC SURGERY | Facility: CLINIC | Age: 54
End: 2024-05-29

## 2024-06-10 ENCOUNTER — APPOINTMENT (OUTPATIENT)
Dept: HEART AND VASCULAR | Facility: CLINIC | Age: 54
End: 2024-06-10
Payer: MEDICARE

## 2024-06-10 ENCOUNTER — NON-APPOINTMENT (OUTPATIENT)
Age: 54
End: 2024-06-10

## 2024-06-10 VITALS
TEMPERATURE: 98.2 F | DIASTOLIC BLOOD PRESSURE: 74 MMHG | HEIGHT: 64 IN | SYSTOLIC BLOOD PRESSURE: 100 MMHG | HEART RATE: 103 BPM | BODY MASS INDEX: 34.31 KG/M2 | WEIGHT: 201 LBS | OXYGEN SATURATION: 97 %

## 2024-06-10 DIAGNOSIS — E78.5 HYPERLIPIDEMIA, UNSPECIFIED: ICD-10-CM

## 2024-06-10 DIAGNOSIS — Z98.61 ATHEROSCLEROTIC HEART DISEASE OF NATIVE CORONARY ARTERY W/OUT ANGINA PECTORIS: ICD-10-CM

## 2024-06-10 DIAGNOSIS — I25.10 ATHEROSCLEROTIC HEART DISEASE OF NATIVE CORONARY ARTERY W/OUT ANGINA PECTORIS: ICD-10-CM

## 2024-06-10 PROCEDURE — 93000 ELECTROCARDIOGRAM COMPLETE: CPT

## 2024-06-10 PROCEDURE — G2211 COMPLEX E/M VISIT ADD ON: CPT

## 2024-06-10 PROCEDURE — 99214 OFFICE O/P EST MOD 30 MIN: CPT

## 2024-06-10 NOTE — HISTORY OF PRESENT ILLNESS
[FreeTextEntry1] : 53 year female with CAD who describes having a known GI condition. She had abdominal discomfort lying in certain positions. She reports being diagnosed with SANTA but not requiring a machine. She is working to lose weight. She is walking without limitation. He aerobic capacity is unchanged from 1 year ago.

## 2024-06-10 NOTE — ASSESSMENT
[FreeTextEntry1] : An EKG was performed to evaluate for arrhythmia and ischemia.  CAD-- to continue her ASA, statin and followup in 6 months  I encouraged continued risk factor reduction and gradual increase in aerobic activity as tolerated    32 minutes were spent discussing cardiac risk excluding procedure time

## 2024-06-14 ENCOUNTER — APPOINTMENT (OUTPATIENT)
Dept: CT IMAGING | Facility: HOSPITAL | Age: 54
End: 2024-06-14

## 2024-06-19 ENCOUNTER — APPOINTMENT (OUTPATIENT)
Dept: INTERVENTIONAL RADIOLOGY/VASCULAR | Facility: HOSPITAL | Age: 54
End: 2024-06-19

## 2024-07-08 ENCOUNTER — NON-APPOINTMENT (OUTPATIENT)
Age: 54
End: 2024-07-08

## 2024-07-08 ENCOUNTER — APPOINTMENT (OUTPATIENT)
Dept: OPHTHALMOLOGY | Facility: CLINIC | Age: 54
End: 2024-07-08
Payer: MEDICARE

## 2024-07-08 PROCEDURE — 92014 COMPRE OPH EXAM EST PT 1/>: CPT

## 2024-07-08 PROCEDURE — 92134 CPTRZ OPH DX IMG PST SGM RTA: CPT

## 2024-07-24 ENCOUNTER — NON-APPOINTMENT (OUTPATIENT)
Age: 54
End: 2024-07-24

## 2024-07-24 ENCOUNTER — APPOINTMENT (OUTPATIENT)
Dept: OPHTHALMOLOGY | Facility: CLINIC | Age: 54
End: 2024-07-24
Payer: MEDICARE

## 2024-07-24 PROCEDURE — 92012 INTRM OPH EXAM EST PATIENT: CPT

## 2024-07-24 PROCEDURE — 92134 CPTRZ OPH DX IMG PST SGM RTA: CPT

## 2024-07-31 ENCOUNTER — RX RENEWAL (OUTPATIENT)
Age: 54
End: 2024-07-31

## 2024-08-15 ENCOUNTER — APPOINTMENT (OUTPATIENT)
Dept: GASTROENTEROLOGY | Facility: CLINIC | Age: 54
End: 2024-08-15
Payer: MEDICARE

## 2024-08-15 ENCOUNTER — LABORATORY RESULT (OUTPATIENT)
Age: 54
End: 2024-08-15

## 2024-08-15 VITALS
DIASTOLIC BLOOD PRESSURE: 84 MMHG | RESPIRATION RATE: 18 BRPM | TEMPERATURE: 96.5 F | HEART RATE: 115 BPM | OXYGEN SATURATION: 99 % | SYSTOLIC BLOOD PRESSURE: 122 MMHG | HEIGHT: 64 IN | WEIGHT: 200 LBS | BODY MASS INDEX: 34.15 KG/M2

## 2024-08-15 DIAGNOSIS — K46.9 UNSPECIFIED ABDOMINAL HERNIA W/OUT OBSTRUCTION OR GANGRENE: ICD-10-CM

## 2024-08-15 DIAGNOSIS — K90.41 NON-CELIAC GLUTEN SENSITIVITY: ICD-10-CM

## 2024-08-15 DIAGNOSIS — Z12.11 ENCOUNTER FOR SCREENING FOR MALIGNANT NEOPLASM OF COLON: ICD-10-CM

## 2024-08-15 DIAGNOSIS — K21.9 GASTRO-ESOPHAGEAL REFLUX DISEASE W/OUT ESOPHAGITIS: ICD-10-CM

## 2024-08-15 DIAGNOSIS — E66.9 OBESITY, UNSPECIFIED: ICD-10-CM

## 2024-08-15 PROCEDURE — 99205 OFFICE O/P NEW HI 60 MIN: CPT

## 2024-08-15 PROCEDURE — G2211 COMPLEX E/M VISIT ADD ON: CPT

## 2024-08-15 RX ORDER — POLYETHYLENE GLYCOL 3350 AND ELECTROLYTES WITH LEMON FLAVOR 236; 22.74; 6.74; 5.86; 2.97 G/4L; G/4L; G/4L; G/4L; G/4L
236 POWDER, FOR SOLUTION ORAL
Qty: 1 | Refills: 0 | Status: ACTIVE | COMMUNITY
Start: 2024-08-15 | End: 1900-01-01

## 2024-08-16 NOTE — PHYSICAL EXAM
[Alert] : alert [Normal Voice/Communication] : normal voice/communication [No Acute Distress] : no acute distress [Obese (BMI >= 30)] : obese (BMI >= 30) [Normal] : the appearance was normal, no neck mass [No Respiratory Distress] : no respiratory distress [No Acc Muscle Use] : no accessory muscle use [Respiration, Rhythm And Depth] : normal respiratory rhythm and effort [None] : no edema [Abnormal Walk] : normal gait [Involuntary Movements] : no involuntary movements were seen [Normal Color / Pigmentation] : normal skin color and pigmentation [Normal Affect] : the affect was normal [Recent Memory Normal] : recent memory was not impaired [Normal Mood] : the mood was normal [Remote Memory Normal] : remote memory was not impaired [de-identified] : tachycardic on vitals, improved on radial pulse to <100 bpm [de-identified] : not distended

## 2024-08-16 NOTE — PHYSICAL EXAM
[Alert] : alert [Normal Voice/Communication] : normal voice/communication [No Acute Distress] : no acute distress [Obese (BMI >= 30)] : obese (BMI >= 30) [Normal] : the appearance was normal, no neck mass [No Respiratory Distress] : no respiratory distress [No Acc Muscle Use] : no accessory muscle use [Respiration, Rhythm And Depth] : normal respiratory rhythm and effort [None] : no edema [Abnormal Walk] : normal gait [Involuntary Movements] : no involuntary movements were seen [Normal Color / Pigmentation] : normal skin color and pigmentation [Normal Affect] : the affect was normal [Recent Memory Normal] : recent memory was not impaired [Normal Mood] : the mood was normal [Remote Memory Normal] : remote memory was not impaired [de-identified] : tachycardic on vitals, improved on radial pulse to <100 bpm [de-identified] : not distended

## 2024-08-16 NOTE — HISTORY OF PRESENT ILLNESS
[FreeTextEntry1] : 52 yo F, T2DM, CAD with PCI, TBI, hypothyroidism, multinodular goiter, HLD, GERD on PPI ; here for initial eval  Gas, bloat, diarrhea, constipation, has trouble with belching and passing wind  This past weekend had to soak in a tub and passed stool in the tub  reports she has chronic lyme  Has a difficult time with weight and rybelsus (oral GLP-1), lost 50 lbs  Had reaction to metformin takes half a dose of lexapro for 3 months a yr  takes miralax and dulcolax , had bm after 3 days  Started past few months with stomach issues  Reports has Seasonal Affect Disorder  MedHx: as above SurgHx: hysterectomy and hernia repairs Medications: as outlined in chart, basa, hydrocodone All: reviewed SocHx: lives on ues with mother, denies toxic habits x3, 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

## 2024-08-16 NOTE — HISTORY OF PRESENT ILLNESS
[FreeTextEntry1] : 52 yo F, T2DM, CAD with PCI, TBI, hypothyroidism, multinodular goiter, HLD, GERD on PPI ; here for initial eval  Gas, bloat, diarrhea, constipation, has trouble with belching and passing wind  This past weekend had to soak in a tub and passed stool in the tub  reports she has chronic lyme  Has a difficult time with weight and rybelsus (oral GLP-1), lost 50 lbs  Had reaction to metformin takes half a dose of lexapro for 3 months a yr  takes miralax and dulcolax , had bm after 3 days  Started past few months with stomach issues  Reports has Seasonal Affect Disorder  MedHx: as above SurgHx: hysterectomy and hernia repairs Medications: as outlined in chart, basa, hydrocodone All: reviewed SocHx: lives on ues with mother, denies toxic habits x3, 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

## 2024-08-16 NOTE — END OF VISIT
[Time Spent: ___ minutes] : I have spent [unfilled] minutes of time on the encounter. [] : Fellow [FreeTextEntry3] : Agree w/ above. Pt w/ multiple GI sx as above, here to establish care. For GERD will c/w PPI and perform EGD for BE screening. HP breath testing  today. For chronic constipation on opioids, will optimize bowel regimen as above and refer to dietician. Consider Linzess as needed. Needs colonoscopy for CRC screening, will arrange.

## 2024-08-16 NOTE — ASSESSMENT
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 ?subutex?  #CRC Screening Discussed Risks / Benefits / Alternatives to Colon Cancer Screening, including endoscopic risks of bleeding, infection, perforation, or missed lesion Discussed Diet and Bowel Prep leading up to procedure Discussed need for chaperone post procedurally Golytely Prep at [facility] Recently evaluated by her cardiolofgist Dr. Falk, normal stress echo, on monoplatelet reports normal exercise tolerance and adequate METS  #Hernia, ?post surgical - refer to Dr. Magdaleno for evaluation  obtain records from prior GI Dr. Maye Toth  f/u post procedure with myself or Dr. Jaun Aleman MD GI Fellow [facility]

## 2024-08-16 NOTE — ASSESSMENT
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 ?subutex?  #CRC Screening Discussed Risks / Benefits / Alternatives to Colon Cancer Screening, including endoscopic risks of bleeding, infection, perforation, or missed lesion Discussed Diet and Bowel Prep leading up to procedure Discussed need for chaperone post procedurally Golytely Prep at [facility] Recently evaluated by her cardiolofgist Dr. Falk, normal stress echo, on monoplatelet reports normal exercise tolerance and adequate METS  #Hernia, ?post surgical - refer to Dr. Magdaleno for evaluation  obtain records from prior GI Dr. Maye Toth  f/u post procedure with myself or Dr. Jaun Aleman MD GI Fellow [facility]

## 2024-08-21 ENCOUNTER — APPOINTMENT (OUTPATIENT)
Dept: DERMATOLOGY | Facility: CLINIC | Age: 54
End: 2024-08-21
Payer: MEDICARE

## 2024-08-21 DIAGNOSIS — L21.9 SEBORRHEIC DERMATITIS, UNSPECIFIED: ICD-10-CM

## 2024-08-21 DIAGNOSIS — L64.9 ANDROGENIC ALOPECIA, UNSPECIFIED: ICD-10-CM

## 2024-08-21 DIAGNOSIS — D22.9 MELANOCYTIC NEVI, UNSPECIFIED: ICD-10-CM

## 2024-08-21 DIAGNOSIS — L82.1 OTHER SEBORRHEIC KERATOSIS: ICD-10-CM

## 2024-08-21 PROCEDURE — 99214 OFFICE O/P EST MOD 30 MIN: CPT

## 2024-08-21 RX ORDER — CLOBETASOL PROPIONATE 0.5 MG/ML
0.05 SOLUTION TOPICAL
Qty: 1 | Refills: 2 | Status: ACTIVE | COMMUNITY
Start: 2024-08-21 | End: 1900-01-01

## 2024-08-21 NOTE — HISTORY OF PRESENT ILLNESS
[FreeTextEntry1] : tbse [de-identified] : 54yo F presents for tbse last seen 8/2023 by Dr. Paulino No personal or family hx of skin cancer No new, changing, growing, bleeding, concerning skin lesions noticed has varicose veins has AGA - has tried various supplements including viviscal, has tried topical minoxidil, makes seb derm worse

## 2024-08-21 NOTE — ASSESSMENT
[FreeTextEntry1] : #Multiple benign nevi - chronic, stable - I discussed the chronic nature and course of the condition - Photoprotection discussed, recommend daily broad-spectrum sunscreen, SPF 30 or greater, UPF hat, clothing. - Pt educated on ABCDE of melanoma - Recommend self-skin exam and annual skin exam by MD - Pt instructed to return for new or changing lesions especially if any moles start to change, itch, or bleed   # Seborrheic keratosis, trunk/extremities  - chronic, stable -I discussed the chronic nature and course of the condition -reviewed benign nature  #Androgenetic alopecia -chronic, flaring -I have discussed the chronic nature and course of this condition - restart minoxidil 5% foam or solution to affected areas daily. Proper medication use and side effects discussed, including unwanted hair growth in unintended treated areas. Advised that medication should be continued to maintain any clinical effects. Counseled best results around 4 months and may have shedding around 2 weeks, which can last 4-6 weeks. -can continue viviscal if desired  #Seborrheic dermatitis, scalp, chronic, flaring -I discussed the chronic nature and course of this condition -Start Ketoconazole shampoo 2-3 times a week to the scalp. Leave on for at least 5 mins before rinsing out. Potential SE reviewed including dryness, irritation. Alternate with head and shoulders or DHS zinc shampoo. If not covered, can use otc Nizoral shampoo. -Start clobetasol solution daily to AA until improved then as needed, SED, do not get on face.   RTC 1 year for TBSE, sooner PRN

## 2024-08-21 NOTE — PHYSICAL EXAM
[Alert] : alert [Oriented x 3] : ~L oriented x 3 [Full Body Skin Exam Performed] : performed [FreeTextEntry3] : PE:   General: well-appearing, alert, in no acute distress  Full body skin exam performed examining scalp, head, face, ears, eyes, mouth, neck, chest, back, abdomen, axilla, b/l arms, b/l forearms, b/l hands, b/l fingernails, b/l thighs, b/l legs, b/l feet, b/l toenails, groin, buttocks Pertinent findings include: -scattered light brown to dark brown colored <6mm papules and macules without any irregular border, color, or asymmetry on the trunk and extremities, consistent with benign nevi. -brown stuck on papules, plaques on the trunk and extremities -widening of hair part -superficial veins bl lower extremities

## 2024-08-21 NOTE — HISTORY OF PRESENT ILLNESS
[FreeTextEntry1] : tbse [de-identified] : 52yo F presents for tbse last seen 8/2023 by Dr. Paulino No personal or family hx of skin cancer No new, changing, growing, bleeding, concerning skin lesions noticed has varicose veins has AGA - has tried various supplements including viviscal, has tried topical minoxidil, makes seb derm worse

## 2024-08-22 ENCOUNTER — NON-APPOINTMENT (OUTPATIENT)
Age: 54
End: 2024-08-22

## 2024-08-22 ENCOUNTER — APPOINTMENT (OUTPATIENT)
Dept: OPHTHALMOLOGY | Facility: CLINIC | Age: 54
End: 2024-08-22
Payer: MEDICARE

## 2024-08-22 LAB
CELIAC DISEASE INTERPRETATION: NORMAL
CELIAC GENE PAIRS PRESENT: NORMAL
DQ ALPHA 1: NORMAL
DQ BETA 1: NORMAL
IMMUNOGLOBULIN A (IGA): 231 MG/DL
UREA BREATH TEST QL: NEGATIVE

## 2024-08-22 PROCEDURE — 92134 CPTRZ OPH DX IMG PST SGM RTA: CPT

## 2024-08-22 PROCEDURE — 92012 INTRM OPH EXAM EST PATIENT: CPT

## 2024-08-23 ENCOUNTER — RX RENEWAL (OUTPATIENT)
Age: 54
End: 2024-08-23

## 2024-11-05 ENCOUNTER — APPOINTMENT (OUTPATIENT)
Dept: ENDOCRINOLOGY | Facility: CLINIC | Age: 54
End: 2024-11-05
Payer: MEDICARE

## 2024-11-05 VITALS
WEIGHT: 200 LBS | SYSTOLIC BLOOD PRESSURE: 126 MMHG | DIASTOLIC BLOOD PRESSURE: 86 MMHG | HEART RATE: 104 BPM | BODY MASS INDEX: 34.33 KG/M2

## 2024-11-05 DIAGNOSIS — E11.69 TYPE 2 DIABETES MELLITUS WITH OTHER SPECIFIED COMPLICATION: ICD-10-CM

## 2024-11-05 DIAGNOSIS — E03.9 HYPOTHYROIDISM, UNSPECIFIED: ICD-10-CM

## 2024-11-05 DIAGNOSIS — E78.5 TYPE 2 DIABETES MELLITUS WITH OTHER SPECIFIED COMPLICATION: ICD-10-CM

## 2024-11-05 DIAGNOSIS — E66.811 OBESITY, CLASS 1: ICD-10-CM

## 2024-11-05 LAB
GLUCOSE BLDC GLUCOMTR-MCNC: 117
HBA1C MFR BLD HPLC: 5.3

## 2024-11-05 PROCEDURE — 99214 OFFICE O/P EST MOD 30 MIN: CPT

## 2024-11-05 PROCEDURE — 83036 HEMOGLOBIN GLYCOSYLATED A1C: CPT | Mod: QW

## 2024-11-05 PROCEDURE — 82962 GLUCOSE BLOOD TEST: CPT

## 2024-11-05 PROCEDURE — G2211 COMPLEX E/M VISIT ADD ON: CPT

## 2024-11-05 RX ORDER — TIRZEPATIDE 5 MG/.5ML
5 INJECTION, SOLUTION SUBCUTANEOUS
Qty: 1 | Refills: 5 | Status: ACTIVE | COMMUNITY
Start: 2024-11-05 | End: 2025-05-04

## 2024-11-06 ENCOUNTER — APPOINTMENT (OUTPATIENT)
Dept: INTERNAL MEDICINE | Facility: CLINIC | Age: 54
End: 2024-11-06
Payer: MEDICARE

## 2024-11-06 ENCOUNTER — MED ADMIN CHARGE (OUTPATIENT)
Age: 54
End: 2024-11-06

## 2024-11-06 VITALS
WEIGHT: 190 LBS | DIASTOLIC BLOOD PRESSURE: 92 MMHG | OXYGEN SATURATION: 99 % | HEIGHT: 64 IN | TEMPERATURE: 97.3 F | BODY MASS INDEX: 32.44 KG/M2 | SYSTOLIC BLOOD PRESSURE: 123 MMHG | HEART RATE: 108 BPM

## 2024-11-06 DIAGNOSIS — Z23 ENCOUNTER FOR IMMUNIZATION: ICD-10-CM

## 2024-11-06 DIAGNOSIS — N95.1 MENOPAUSAL AND FEMALE CLIMACTERIC STATES: ICD-10-CM

## 2024-11-06 DIAGNOSIS — F33.8 OTHER RECURRENT DEPRESSIVE DISORDERS: ICD-10-CM

## 2024-11-06 DIAGNOSIS — E66.9 OBESITY, UNSPECIFIED: ICD-10-CM

## 2024-11-06 DIAGNOSIS — E11.9 TYPE 2 DIABETES MELLITUS W/OUT COMPLICATIONS: ICD-10-CM

## 2024-11-06 PROCEDURE — 36415 COLL VENOUS BLD VENIPUNCTURE: CPT

## 2024-11-06 PROCEDURE — 91320 SARSCV2 VAC 30MCG TRS-SUC IM: CPT

## 2024-11-06 PROCEDURE — 99213 OFFICE O/P EST LOW 20 MIN: CPT

## 2024-11-06 PROCEDURE — 90480 ADMN SARSCOV2 VAC 1/ONLY CMP: CPT | Mod: GY

## 2024-11-06 RX ORDER — ESCITALOPRAM OXALATE 10 MG/1
10 TABLET ORAL
Qty: 90 | Refills: 0 | Status: ACTIVE | COMMUNITY
Start: 2024-11-06 | End: 1900-01-01

## 2024-11-11 ENCOUNTER — APPOINTMENT (OUTPATIENT)
Age: 54
End: 2024-11-11

## 2024-11-11 VITALS
BODY MASS INDEX: 32.44 KG/M2 | HEIGHT: 64 IN | DIASTOLIC BLOOD PRESSURE: 79 MMHG | WEIGHT: 190 LBS | HEART RATE: 112 BPM | OXYGEN SATURATION: 99 % | SYSTOLIC BLOOD PRESSURE: 111 MMHG

## 2024-11-11 PROCEDURE — 99396 PREV VISIT EST AGE 40-64: CPT | Mod: GY

## 2024-11-23 ENCOUNTER — RX RENEWAL (OUTPATIENT)
Age: 54
End: 2024-11-23

## 2024-12-05 ENCOUNTER — APPOINTMENT (OUTPATIENT)
Dept: HEART AND VASCULAR | Facility: CLINIC | Age: 54
End: 2024-12-05
Payer: MEDICARE

## 2024-12-05 ENCOUNTER — NON-APPOINTMENT (OUTPATIENT)
Age: 54
End: 2024-12-05

## 2024-12-05 VITALS
DIASTOLIC BLOOD PRESSURE: 76 MMHG | HEART RATE: 80 BPM | SYSTOLIC BLOOD PRESSURE: 120 MMHG | HEIGHT: 64 IN | OXYGEN SATURATION: 97 % | WEIGHT: 189.99 LBS | BODY MASS INDEX: 32.44 KG/M2 | TEMPERATURE: 98.7 F

## 2024-12-05 DIAGNOSIS — I25.10 ATHEROSCLEROTIC HEART DISEASE OF NATIVE CORONARY ARTERY W/OUT ANGINA PECTORIS: ICD-10-CM

## 2024-12-05 PROCEDURE — 93000 ELECTROCARDIOGRAM COMPLETE: CPT

## 2024-12-05 PROCEDURE — 36415 COLL VENOUS BLD VENIPUNCTURE: CPT

## 2024-12-05 PROCEDURE — 99214 OFFICE O/P EST MOD 30 MIN: CPT

## 2024-12-05 PROCEDURE — G2211 COMPLEX E/M VISIT ADD ON: CPT

## 2024-12-05 PROCEDURE — 90656 IIV3 VACC NO PRSV 0.5 ML IM: CPT

## 2024-12-05 PROCEDURE — G0008: CPT

## 2024-12-05 RX ORDER — DICLOFENAC EPOLAMINE 0.01 G/1
1.3 SYSTEM TOPICAL TWICE DAILY
Qty: 30 | Refills: 0 | Status: ACTIVE | COMMUNITY
Start: 2024-12-05

## 2024-12-12 ENCOUNTER — NON-APPOINTMENT (OUTPATIENT)
Age: 54
End: 2024-12-12

## 2025-01-27 ENCOUNTER — APPOINTMENT (OUTPATIENT)
Dept: ENDOCRINOLOGY | Facility: CLINIC | Age: 55
End: 2025-01-27

## 2025-02-15 ENCOUNTER — RX RENEWAL (OUTPATIENT)
Age: 55
End: 2025-02-15

## 2025-02-17 ENCOUNTER — RX RENEWAL (OUTPATIENT)
Age: 55
End: 2025-02-17

## 2025-02-18 ENCOUNTER — APPOINTMENT (OUTPATIENT)
Dept: SURGERY | Facility: CLINIC | Age: 55
End: 2025-02-18

## 2025-03-04 ENCOUNTER — APPOINTMENT (OUTPATIENT)
Dept: SURGERY | Facility: CLINIC | Age: 55
End: 2025-03-04
Payer: MEDICARE

## 2025-03-04 VITALS
DIASTOLIC BLOOD PRESSURE: 85 MMHG | TEMPERATURE: 96.6 F | SYSTOLIC BLOOD PRESSURE: 120 MMHG | OXYGEN SATURATION: 98 % | HEIGHT: 64 IN | BODY MASS INDEX: 33.63 KG/M2 | WEIGHT: 197 LBS | HEART RATE: 120 BPM

## 2025-03-04 DIAGNOSIS — K46.9 UNSPECIFIED ABDOMINAL HERNIA W/OUT OBSTRUCTION OR GANGRENE: ICD-10-CM

## 2025-03-04 PROCEDURE — 99205 OFFICE O/P NEW HI 60 MIN: CPT

## 2025-05-01 ENCOUNTER — NON-APPOINTMENT (OUTPATIENT)
Age: 55
End: 2025-05-01

## 2025-05-01 ENCOUNTER — APPOINTMENT (OUTPATIENT)
Dept: HEART AND VASCULAR | Facility: CLINIC | Age: 55
End: 2025-05-01

## 2025-05-01 VITALS
BODY MASS INDEX: 33.29 KG/M2 | HEART RATE: 92 BPM | HEIGHT: 64 IN | TEMPERATURE: 97.6 F | DIASTOLIC BLOOD PRESSURE: 74 MMHG | SYSTOLIC BLOOD PRESSURE: 134 MMHG | WEIGHT: 195 LBS | OXYGEN SATURATION: 99 %

## 2025-05-01 DIAGNOSIS — R53.83 OTHER FATIGUE: ICD-10-CM

## 2025-05-01 DIAGNOSIS — I95.9 HYPOTENSION, UNSPECIFIED: ICD-10-CM

## 2025-05-01 DIAGNOSIS — I25.10 ATHEROSCLEROTIC HEART DISEASE OF NATIVE CORONARY ARTERY W/OUT ANGINA PECTORIS: ICD-10-CM

## 2025-05-01 PROCEDURE — 99213 OFFICE O/P EST LOW 20 MIN: CPT

## 2025-05-01 PROCEDURE — G2211 COMPLEX E/M VISIT ADD ON: CPT

## 2025-05-01 PROCEDURE — 93000 ELECTROCARDIOGRAM COMPLETE: CPT

## 2025-05-06 PROBLEM — I95.9 HYPOTENSION: Status: ACTIVE | Noted: 2025-05-06

## 2025-05-13 ENCOUNTER — APPOINTMENT (OUTPATIENT)
Dept: ENDOCRINOLOGY | Facility: CLINIC | Age: 55
End: 2025-05-13
Payer: MEDICARE

## 2025-05-13 VITALS
SYSTOLIC BLOOD PRESSURE: 131 MMHG | BODY MASS INDEX: 33.13 KG/M2 | HEART RATE: 91 BPM | DIASTOLIC BLOOD PRESSURE: 86 MMHG | WEIGHT: 193 LBS

## 2025-05-13 DIAGNOSIS — E11.9 TYPE 2 DIABETES MELLITUS W/OUT COMPLICATIONS: ICD-10-CM

## 2025-05-13 DIAGNOSIS — E78.5 HYPERLIPIDEMIA, UNSPECIFIED: ICD-10-CM

## 2025-05-13 DIAGNOSIS — E03.9 HYPOTHYROIDISM, UNSPECIFIED: ICD-10-CM

## 2025-05-13 DIAGNOSIS — E66.811 OBESITY, CLASS 1: ICD-10-CM

## 2025-05-13 LAB
GLUCOSE BLDC GLUCOMTR-MCNC: 98
HBA1C MFR BLD HPLC: 5.5

## 2025-05-13 PROCEDURE — 82962 GLUCOSE BLOOD TEST: CPT

## 2025-05-13 PROCEDURE — 83036 HEMOGLOBIN GLYCOSYLATED A1C: CPT | Mod: QW

## 2025-05-13 PROCEDURE — 99214 OFFICE O/P EST MOD 30 MIN: CPT

## 2025-05-13 PROCEDURE — G2211 COMPLEX E/M VISIT ADD ON: CPT

## 2025-05-20 ENCOUNTER — OUTPATIENT (OUTPATIENT)
Dept: OUTPATIENT SERVICES | Facility: HOSPITAL | Age: 55
LOS: 1 days | End: 2025-05-20
Payer: MEDICARE

## 2025-05-20 ENCOUNTER — APPOINTMENT (OUTPATIENT)
Dept: CT IMAGING | Facility: HOSPITAL | Age: 55
End: 2025-05-20

## 2025-05-20 DIAGNOSIS — Z98.61 CORONARY ANGIOPLASTY STATUS: Chronic | ICD-10-CM

## 2025-05-20 DIAGNOSIS — Z98.890 OTHER SPECIFIED POSTPROCEDURAL STATES: Chronic | ICD-10-CM

## 2025-05-20 DIAGNOSIS — S01.20XA UNSPECIFIED OPEN WOUND OF NOSE, INITIAL ENCOUNTER: Chronic | ICD-10-CM

## 2025-05-20 DIAGNOSIS — Z86.39 PERSONAL HISTORY OF OTHER ENDOCRINE, NUTRITIONAL AND METABOLIC DISEASE: Chronic | ICD-10-CM

## 2025-05-20 DIAGNOSIS — Z98.89 OTHER SPECIFIED POSTPROCEDURAL STATES: Chronic | ICD-10-CM

## 2025-05-20 PROCEDURE — 74177 CT ABD & PELVIS W/CONTRAST: CPT | Mod: 26

## 2025-05-20 PROCEDURE — 74177 CT ABD & PELVIS W/CONTRAST: CPT

## 2025-05-20 PROCEDURE — 82565 ASSAY OF CREATININE: CPT

## 2025-06-13 ENCOUNTER — RX RENEWAL (OUTPATIENT)
Age: 55
End: 2025-06-13

## 2025-06-18 ENCOUNTER — APPOINTMENT (OUTPATIENT)
Dept: INTERNAL MEDICINE | Facility: CLINIC | Age: 55
End: 2025-06-18

## 2025-06-19 ENCOUNTER — APPOINTMENT (OUTPATIENT)
Dept: HEART AND VASCULAR | Facility: CLINIC | Age: 55
End: 2025-06-19
Payer: MEDICARE

## 2025-06-19 VITALS
WEIGHT: 184.99 LBS | OXYGEN SATURATION: 97 % | HEIGHT: 64 IN | BODY MASS INDEX: 31.58 KG/M2 | SYSTOLIC BLOOD PRESSURE: 110 MMHG | HEART RATE: 104 BPM | DIASTOLIC BLOOD PRESSURE: 80 MMHG

## 2025-06-19 PROCEDURE — 99214 OFFICE O/P EST MOD 30 MIN: CPT

## 2025-06-19 PROCEDURE — 93000 ELECTROCARDIOGRAM COMPLETE: CPT

## 2025-06-30 ENCOUNTER — APPOINTMENT (OUTPATIENT)
Dept: OPHTHALMOLOGY | Facility: CLINIC | Age: 55
End: 2025-06-30
Payer: MEDICARE

## 2025-06-30 ENCOUNTER — NON-APPOINTMENT (OUTPATIENT)
Age: 55
End: 2025-06-30

## 2025-06-30 PROCEDURE — 92134 CPTRZ OPH DX IMG PST SGM RTA: CPT

## 2025-06-30 PROCEDURE — 92014 COMPRE OPH EXAM EST PT 1/>: CPT

## 2025-07-01 ENCOUNTER — NON-APPOINTMENT (OUTPATIENT)
Age: 55
End: 2025-07-01

## 2025-07-21 ENCOUNTER — APPOINTMENT (OUTPATIENT)
Dept: HEART AND VASCULAR | Facility: CLINIC | Age: 55
End: 2025-07-21
Payer: MEDICARE

## 2025-07-21 VITALS
HEIGHT: 64 IN | OXYGEN SATURATION: 97 % | SYSTOLIC BLOOD PRESSURE: 108 MMHG | WEIGHT: 185 LBS | TEMPERATURE: 98 F | DIASTOLIC BLOOD PRESSURE: 70 MMHG | HEART RATE: 111 BPM | BODY MASS INDEX: 31.58 KG/M2

## 2025-07-21 DIAGNOSIS — I25.10 ATHEROSCLEROTIC HEART DISEASE OF NATIVE CORONARY ARTERY W/OUT ANGINA PECTORIS: ICD-10-CM

## 2025-07-21 DIAGNOSIS — R94.31 ABNORMAL ELECTROCARDIOGRAM [ECG] [EKG]: ICD-10-CM

## 2025-07-21 DIAGNOSIS — E55.9 VITAMIN D DEFICIENCY, UNSPECIFIED: ICD-10-CM

## 2025-07-21 PROCEDURE — G2211 COMPLEX E/M VISIT ADD ON: CPT

## 2025-07-21 PROCEDURE — 99213 OFFICE O/P EST LOW 20 MIN: CPT

## 2025-08-04 PROBLEM — K43.0 RECURRENT INCISIONAL HERNIA WITH INCARCERATION: Status: ACTIVE | Noted: 2025-08-04

## 2025-08-05 ENCOUNTER — APPOINTMENT (OUTPATIENT)
Dept: SURGERY | Facility: CLINIC | Age: 55
End: 2025-08-05
Payer: MEDICARE

## 2025-08-05 VITALS
OXYGEN SATURATION: 99 % | HEART RATE: 102 BPM | TEMPERATURE: 96.8 F | SYSTOLIC BLOOD PRESSURE: 107 MMHG | BODY MASS INDEX: 31.92 KG/M2 | HEIGHT: 64 IN | WEIGHT: 187 LBS | DIASTOLIC BLOOD PRESSURE: 73 MMHG

## 2025-08-05 DIAGNOSIS — K43.2 INCISIONAL HERNIA W/OUT OBSTRUCTION OR GANGRENE: ICD-10-CM

## 2025-08-05 DIAGNOSIS — K42.9 UMBILICAL HERNIA W/OUT OBSTRUCTION OR GANGRENE: ICD-10-CM

## 2025-08-05 DIAGNOSIS — K43.0 INCISIONAL HERNIA WITH OBSTRUCTION, W/OUT GANGRENE: ICD-10-CM

## 2025-08-05 PROCEDURE — 99213 OFFICE O/P EST LOW 20 MIN: CPT

## 2025-08-07 ENCOUNTER — NON-APPOINTMENT (OUTPATIENT)
Age: 55
End: 2025-08-07

## 2025-08-14 ENCOUNTER — RX RENEWAL (OUTPATIENT)
Age: 55
End: 2025-08-14